# Patient Record
Sex: FEMALE | Race: WHITE | NOT HISPANIC OR LATINO | Employment: OTHER | URBAN - METROPOLITAN AREA
[De-identification: names, ages, dates, MRNs, and addresses within clinical notes are randomized per-mention and may not be internally consistent; named-entity substitution may affect disease eponyms.]

---

## 2018-10-25 ENCOUNTER — HOSPITAL ENCOUNTER (OUTPATIENT)
Dept: RADIOLOGY | Facility: HOSPITAL | Age: 78
Discharge: HOME | End: 2018-10-25
Attending: FAMILY MEDICINE
Payer: MEDICARE

## 2018-10-25 VITALS — BODY MASS INDEX: 23.62 KG/M2 | WEIGHT: 105 LBS | HEIGHT: 56 IN

## 2018-10-25 DIAGNOSIS — Z12.39 SCREENING BREAST EXAMINATION: Primary | ICD-10-CM

## 2018-10-25 DIAGNOSIS — Z12.39 SCREENING BREAST EXAMINATION: ICD-10-CM

## 2018-10-25 PROCEDURE — 77067 SCR MAMMO BI INCL CAD: CPT | Mod: TC,PO

## 2018-10-25 PROCEDURE — 77067 SCR MAMMO BI INCL CAD: CPT | Mod: 26,,, | Performed by: RADIOLOGY

## 2018-10-25 PROCEDURE — 77063 BREAST TOMOSYNTHESIS BI: CPT | Mod: TC,PO

## 2018-10-25 PROCEDURE — 77063 BREAST TOMOSYNTHESIS BI: CPT | Mod: 26,,, | Performed by: RADIOLOGY

## 2019-12-10 ENCOUNTER — OFFICE VISIT (OUTPATIENT)
Dept: INTERNAL MEDICINE | Facility: CLINIC | Age: 79
End: 2019-12-10
Payer: MEDICARE

## 2019-12-10 VITALS
HEIGHT: 55 IN | TEMPERATURE: 97 F | DIASTOLIC BLOOD PRESSURE: 72 MMHG | WEIGHT: 114.19 LBS | OXYGEN SATURATION: 96 % | RESPIRATION RATE: 12 BRPM | SYSTOLIC BLOOD PRESSURE: 122 MMHG | BODY MASS INDEX: 26.43 KG/M2 | HEART RATE: 81 BPM

## 2019-12-10 DIAGNOSIS — G47.09 OTHER INSOMNIA: ICD-10-CM

## 2019-12-10 DIAGNOSIS — I10 ESSENTIAL HYPERTENSION: ICD-10-CM

## 2019-12-10 PROCEDURE — 99999 PR PBB SHADOW E&M-EST. PATIENT-LVL III: CPT | Mod: PBBFAC,,, | Performed by: FAMILY MEDICINE

## 2019-12-10 PROCEDURE — 1126F AMNT PAIN NOTED NONE PRSNT: CPT | Mod: S$GLB,,, | Performed by: FAMILY MEDICINE

## 2019-12-10 PROCEDURE — 1100F PTFALLS ASSESS-DOCD GE2>/YR: CPT | Mod: CPTII,S$GLB,, | Performed by: FAMILY MEDICINE

## 2019-12-10 PROCEDURE — 99203 PR OFFICE/OUTPT VISIT, NEW, LEVL III, 30-44 MIN: ICD-10-PCS | Mod: S$GLB,,, | Performed by: FAMILY MEDICINE

## 2019-12-10 PROCEDURE — 3288F PR FALLS RISK ASSESSMENT DOCUMENTED: ICD-10-PCS | Mod: CPTII,S$GLB,, | Performed by: FAMILY MEDICINE

## 2019-12-10 PROCEDURE — 3288F FALL RISK ASSESSMENT DOCD: CPT | Mod: CPTII,S$GLB,, | Performed by: FAMILY MEDICINE

## 2019-12-10 PROCEDURE — 1159F MED LIST DOCD IN RCRD: CPT | Mod: S$GLB,,, | Performed by: FAMILY MEDICINE

## 2019-12-10 PROCEDURE — 1159F PR MEDICATION LIST DOCUMENTED IN MEDICAL RECORD: ICD-10-PCS | Mod: S$GLB,,, | Performed by: FAMILY MEDICINE

## 2019-12-10 PROCEDURE — 99999 PR PBB SHADOW E&M-EST. PATIENT-LVL III: ICD-10-PCS | Mod: PBBFAC,,, | Performed by: FAMILY MEDICINE

## 2019-12-10 PROCEDURE — 1126F PR PAIN SEVERITY QUANTIFIED, NO PAIN PRESENT: ICD-10-PCS | Mod: S$GLB,,, | Performed by: FAMILY MEDICINE

## 2019-12-10 PROCEDURE — 1100F PR PT FALLS ASSESS DOC 2+ FALLS/FALL W/INJURY/YR: ICD-10-PCS | Mod: CPTII,S$GLB,, | Performed by: FAMILY MEDICINE

## 2019-12-10 PROCEDURE — 99203 OFFICE O/P NEW LOW 30 MIN: CPT | Mod: S$GLB,,, | Performed by: FAMILY MEDICINE

## 2019-12-10 RX ORDER — AMLODIPINE BESYLATE 5 MG/1
5 TABLET ORAL
COMMUNITY
Start: 2019-11-20 | End: 2019-12-10 | Stop reason: SDUPTHER

## 2019-12-10 RX ORDER — LORATADINE 10 MG/1
10 TABLET ORAL DAILY
Qty: 90 TABLET | Refills: 4 | Status: SHIPPED | OUTPATIENT
Start: 2019-12-10 | End: 2020-03-27 | Stop reason: SDUPTHER

## 2019-12-10 RX ORDER — HYDROCHLOROTHIAZIDE 12.5 MG/1
12.5 TABLET ORAL DAILY
Qty: 90 TABLET | Refills: 4 | Status: SHIPPED | OUTPATIENT
Start: 2019-12-10 | End: 2020-03-27 | Stop reason: SDUPTHER

## 2019-12-10 RX ORDER — SIMVASTATIN 40 MG/1
40 TABLET, FILM COATED ORAL NIGHTLY
Qty: 90 TABLET | Refills: 4 | Status: SHIPPED | OUTPATIENT
Start: 2019-12-10 | End: 2020-03-27 | Stop reason: SDUPTHER

## 2019-12-10 RX ORDER — ZOLPIDEM TARTRATE 5 MG/1
5 TABLET ORAL NIGHTLY PRN
Qty: 90 TABLET | Refills: 0 | Status: SHIPPED | OUTPATIENT
Start: 2019-12-10 | End: 2020-03-27 | Stop reason: SDUPTHER

## 2019-12-10 RX ORDER — AMLODIPINE BESYLATE 5 MG/1
5 TABLET ORAL DAILY
Qty: 90 TABLET | Refills: 4 | Status: SHIPPED | OUTPATIENT
Start: 2019-12-10 | End: 2020-07-20 | Stop reason: ALTCHOICE

## 2019-12-10 RX ORDER — LORATADINE 10 MG/1
10 TABLET ORAL DAILY
Refills: 0 | COMMUNITY
Start: 2019-11-20 | End: 2019-12-10 | Stop reason: SDUPTHER

## 2019-12-10 NOTE — PROGRESS NOTES
Subjective:       Patient ID: Lacey Villafana is a 79 y.o. female.    Chief Complaint: Establish Care    HPI    Came in today to establish care.  She has past medical history significant for hypertension and insomnia.  Otherwise has no significant past medical problems.  She lives alone but her son lives next door to her.  She relies on friends to help her get around as she does not drive.  Her  has been  for about 3 years.    No recent falls.      Takes Ambien nightly to help with insomnia.  Taking all other medications as listed below.  Has had a flu shot for this year.    History reviewed. No pertinent family history.    Current Outpatient Medications:     amLODIPine (NORVASC) 5 MG tablet, Take 1 tablet (5 mg total) by mouth once daily., Disp: 90 tablet, Rfl: 4    aspirin (ECOTRIN) 81 MG EC tablet, Take 81 mg by mouth once daily., Disp: , Rfl:     FLUZONE HIGH-DOSE , PF, 180 mcg/0.5 mL Syrg, PHARMACIST ADMINISTERED IMMUNIZATION ADMINISTERED AT TIME OF DISPENSING, Disp: , Rfl: 0    hydroCHLOROthiazide (HYDRODIURIL) 12.5 MG Tab, Take 1 tablet (12.5 mg total) by mouth once daily., Disp: 90 tablet, Rfl: 4    loratadine (CLARITIN) 10 mg tablet, Take 1 tablet (10 mg total) by mouth once daily., Disp: 90 tablet, Rfl: 4    simvastatin (ZOCOR) 40 MG tablet, Take 1 tablet (40 mg total) by mouth every evening., Disp: 90 tablet, Rfl: 4    zolpidem (AMBIEN) 5 MG Tab, Take 1 tablet (5 mg total) by mouth nightly as needed., Disp: 90 tablet, Rfl: 0    Review of Systems   Constitutional: Negative for chills and fever.   HENT: Negative for congestion and sore throat.    Eyes: Negative for visual disturbance.   Respiratory: Negative for cough and shortness of breath.    Cardiovascular: Negative for chest pain.   Gastrointestinal: Negative for abdominal pain, constipation and diarrhea.   Endocrine: Negative for polydipsia and polyuria.   Genitourinary: Negative for difficulty urinating and menstrual  "problem.   Skin: Negative for rash.   Neurological: Negative for dizziness.   Hematological: Does not bruise/bleed easily.       Objective:   /72   Pulse 81   Temp 96.5 °F (35.8 °C) (Tympanic)   Resp 12   Ht 4' 7" (1.397 m)   Wt 51.8 kg (114 lb 3.2 oz)   SpO2 96%   BMI 26.54 kg/m²      Physical Exam   Constitutional: She is oriented to person, place, and time. She appears well-developed and well-nourished. No distress.   HENT:   Head: Normocephalic and atraumatic.   Nose: Nose normal.   Eyes: Pupils are equal, round, and reactive to light. Conjunctivae and EOM are normal. Right eye exhibits no discharge. Left eye exhibits no discharge.   Neck: No thyromegaly present.   Cardiovascular: Normal rate and regular rhythm.   No murmur heard.  Pulmonary/Chest: Effort normal and breath sounds normal. No respiratory distress.   Abdominal: Soft. She exhibits no distension.   Musculoskeletal: She exhibits no edema.   Neurological: She is alert and oriented to person, place, and time.   Skin: No rash noted. She is not diaphoretic.   Psychiatric: She has a normal mood and affect. Her behavior is normal.       Assessment & Plan     Problem List Items Addressed This Visit        Cardiac/Vascular    Essential hypertension (Chronic)    Current Assessment & Plan       Currently at goal.  Continue on same medication and follow up in 6 months.            Other    Other insomnia    Current Assessment & Plan       Refilled Ambien at low dose.  She has not had any negative side effects to  This medicine.                 Follow up in about 6 months (around 6/10/2020).    Disclaimer:  This note may have been prepared using voice recognition software, it may have not been extensively proofed, as such there could be errors within the text such as sound alike errors.  "

## 2020-03-27 RX ORDER — LORATADINE 10 MG/1
10 TABLET ORAL DAILY
Qty: 90 TABLET | Refills: 4 | Status: SHIPPED | OUTPATIENT
Start: 2020-03-27 | End: 2020-07-20 | Stop reason: ALTCHOICE

## 2020-03-27 RX ORDER — ZOLPIDEM TARTRATE 5 MG/1
5 TABLET ORAL NIGHTLY PRN
Qty: 90 TABLET | Refills: 0 | Status: SHIPPED | OUTPATIENT
Start: 2020-03-27 | End: 2020-07-14 | Stop reason: SDUPTHER

## 2020-03-27 RX ORDER — SIMVASTATIN 40 MG/1
40 TABLET, FILM COATED ORAL NIGHTLY
Qty: 90 TABLET | Refills: 4 | Status: SHIPPED | OUTPATIENT
Start: 2020-03-27 | End: 2021-04-06

## 2020-03-27 RX ORDER — HYDROCHLOROTHIAZIDE 12.5 MG/1
12.5 TABLET ORAL DAILY
Qty: 90 TABLET | Refills: 4 | Status: SHIPPED | OUTPATIENT
Start: 2020-03-27 | End: 2020-07-31 | Stop reason: SDUPTHER

## 2020-03-27 NOTE — TELEPHONE ENCOUNTER
----- Message from Susan Pereyra sent at 3/27/2020  8:55 AM CDT -----  Contact: [phone]  Would like to consult with the nurse, patient would like to get a refill on her medication, please call back at [phone], thanks sj  .Type:  RX Refill Request    Who Called:  Ms mckeon  Refill or New Rx:refill  RX Name and Strength:amlodipine 5 mg Also Hydrochlorothiazide 12.5 mg Also loratadine 10 mg Also simvastatin 40 mg Also zolpidem 5 mg  How is the patient currently taking it? (ex. 1XDay):once a day  Is this a 30 day or 90 day RX:  Preferred Pharmacy with phone number:.    [facility] - [place], [place] - [address]  [address] [address]  Phone: [phone] Fax: [phone]      Local or Mail Order: local  Ordering Provider:Dr mckinney  Would the patient rather a call back or a response via [facility]? Call back  Best Call Back Number:[phone]  Additional Information:

## 2020-04-03 ENCOUNTER — TELEPHONE (OUTPATIENT)
Dept: INTERNAL MEDICINE | Facility: CLINIC | Age: 80
End: 2020-04-03

## 2020-04-03 NOTE — TELEPHONE ENCOUNTER
Spoke with pt about medication. [facility]

## 2020-04-03 NOTE — TELEPHONE ENCOUNTER
----- Message from Talia Her sent at 4/3/2020 10:07 AM CDT -----  Contact: pt  Please call pt @ [phone] regarding medication pt need to know the status, pt states she call last week.

## 2020-06-15 ENCOUNTER — TELEPHONE (OUTPATIENT)
Dept: INTERNAL MEDICINE | Facility: CLINIC | Age: 80
End: 2020-06-15

## 2020-06-15 NOTE — TELEPHONE ENCOUNTER
----- Message from Dora Blue sent at 6/15/2020 10:42 AM CDT -----  States her RT leg is swollen. She would like the nurse to give her a call. Please call pt [phone]. Thank you

## 2020-06-15 NOTE — TELEPHONE ENCOUNTER
Patient contacted and informed that per Dr. Davis she would have to be evaluated for her leg swelling. Patient verbally understood and stated that she has an upcoming appt on tomorrow with Dr. Diallo for 8:40 am. Patient verbally understood the appt information.

## 2020-06-15 NOTE — TELEPHONE ENCOUNTER
Patient called in stating that her RT leg is swollen, but it's not hurting her. She does state that she has been working outside standing on the cement (painting) & wonders if this could be causing her RT leg to swell. LOV:  12/10/2019

## 2020-06-16 ENCOUNTER — HOSPITAL ENCOUNTER (OUTPATIENT)
Dept: RADIOLOGY | Facility: HOSPITAL | Age: 80
Discharge: HOME | End: 2020-06-16
Attending: FAMILY MEDICINE
Payer: MEDICARE

## 2020-06-16 ENCOUNTER — OFFICE VISIT (OUTPATIENT)
Dept: INTERNAL MEDICINE | Facility: CLINIC | Age: 80
End: 2020-06-16
Payer: MEDICARE

## 2020-06-16 ENCOUNTER — TELEPHONE (OUTPATIENT)
Dept: INTERNAL MEDICINE | Facility: CLINIC | Age: 80
End: 2020-06-16

## 2020-06-16 ENCOUNTER — TELEPHONE (OUTPATIENT)
Dept: ORTHOPEDICS | Facility: CLINIC | Age: 80
End: 2020-06-16

## 2020-06-16 VITALS
WEIGHT: 116.38 LBS | OXYGEN SATURATION: 98 % | SYSTOLIC BLOOD PRESSURE: 142 MMHG | HEIGHT: 55 IN | DIASTOLIC BLOOD PRESSURE: 78 MMHG | HEART RATE: 70 BPM | BODY MASS INDEX: 26.93 KG/M2 | RESPIRATION RATE: 18 BRPM | TEMPERATURE: 99 F

## 2020-06-16 DIAGNOSIS — M25.561 CHRONIC PAIN OF RIGHT KNEE: ICD-10-CM

## 2020-06-16 DIAGNOSIS — R60.1 GENERALIZED EDEMA: ICD-10-CM

## 2020-06-16 DIAGNOSIS — G89.29 CHRONIC PAIN OF RIGHT KNEE: ICD-10-CM

## 2020-06-16 DIAGNOSIS — R60.1 GENERALIZED EDEMA: Primary | ICD-10-CM

## 2020-06-16 PROCEDURE — 99214 OFFICE O/P EST MOD 30 MIN: CPT | Mod: S$GLB,,, | Performed by: FAMILY MEDICINE

## 2020-06-16 PROCEDURE — 73562 X-RAY EXAM OF KNEE 3: CPT | Mod: 26,50,, | Performed by: RADIOLOGY

## 2020-06-16 PROCEDURE — 93971 US LOWER EXTREMITY VEINS RIGHT: ICD-10-PCS | Mod: 26,RT,, | Performed by: RADIOLOGY

## 2020-06-16 PROCEDURE — 1101F PR PT FALLS ASSESS DOC 0-1 FALLS W/OUT INJ PAST YR: ICD-10-PCS | Mod: CPTII,S$GLB,, | Performed by: FAMILY MEDICINE

## 2020-06-16 PROCEDURE — 73562 X-RAY EXAM OF KNEE 3: CPT | Mod: TC,50,PO

## 2020-06-16 PROCEDURE — 1125F AMNT PAIN NOTED PAIN PRSNT: CPT | Mod: S$GLB,,, | Performed by: FAMILY MEDICINE

## 2020-06-16 PROCEDURE — 99214 PR OFFICE/OUTPT VISIT, EST, LEVL IV, 30-39 MIN: ICD-10-PCS | Mod: S$GLB,,, | Performed by: FAMILY MEDICINE

## 2020-06-16 PROCEDURE — 3078F DIAST BP <80 MM HG: CPT | Mod: CPTII,S$GLB,, | Performed by: FAMILY MEDICINE

## 2020-06-16 PROCEDURE — 1159F MED LIST DOCD IN RCRD: CPT | Mod: S$GLB,,, | Performed by: FAMILY MEDICINE

## 2020-06-16 PROCEDURE — 1159F PR MEDICATION LIST DOCUMENTED IN MEDICAL RECORD: ICD-10-PCS | Mod: S$GLB,,, | Performed by: FAMILY MEDICINE

## 2020-06-16 PROCEDURE — 93971 EXTREMITY STUDY: CPT | Mod: 26,RT,, | Performed by: RADIOLOGY

## 2020-06-16 PROCEDURE — 1125F PR PAIN SEVERITY QUANTIFIED, PAIN PRESENT: ICD-10-PCS | Mod: S$GLB,,, | Performed by: FAMILY MEDICINE

## 2020-06-16 PROCEDURE — 1101F PT FALLS ASSESS-DOCD LE1/YR: CPT | Mod: CPTII,S$GLB,, | Performed by: FAMILY MEDICINE

## 2020-06-16 PROCEDURE — 99999 PR PBB SHADOW E&M-EST. PATIENT-LVL V: ICD-10-PCS | Mod: PBBFAC,,, | Performed by: FAMILY MEDICINE

## 2020-06-16 PROCEDURE — 3077F PR MOST RECENT SYSTOLIC BLOOD PRESSURE >= 140 MM HG: ICD-10-PCS | Mod: CPTII,S$GLB,, | Performed by: FAMILY MEDICINE

## 2020-06-16 PROCEDURE — 3078F PR MOST RECENT DIASTOLIC BLOOD PRESSURE < 80 MM HG: ICD-10-PCS | Mod: CPTII,S$GLB,, | Performed by: FAMILY MEDICINE

## 2020-06-16 PROCEDURE — 99999 PR PBB SHADOW E&M-EST. PATIENT-LVL V: CPT | Mod: PBBFAC,,, | Performed by: FAMILY MEDICINE

## 2020-06-16 PROCEDURE — 3077F SYST BP >= 140 MM HG: CPT | Mod: CPTII,S$GLB,, | Performed by: FAMILY MEDICINE

## 2020-06-16 PROCEDURE — 73562 XR KNEE ORTHO BILAT: ICD-10-PCS | Mod: 26,50,, | Performed by: RADIOLOGY

## 2020-06-16 PROCEDURE — 93971 EXTREMITY STUDY: CPT | Mod: TC,PO,RT

## 2020-06-16 RX ORDER — GLUCOSAMINE/CHONDRO SU A 500-400 MG
1 TABLET ORAL 3 TIMES DAILY
COMMUNITY

## 2020-06-16 NOTE — ASSESSMENT & PLAN NOTE
-Did discuss some causes of extremity swelling such as venous insufficiency.  Amlodipine can also cause swelling however both of these are usually bilateral as opposed unilateral.  did explain that some swelling may due to knee  -Did order ultrasound for reassurance as the patient and relative were very concerned

## 2020-06-16 NOTE — PROGRESS NOTES
Patient ID: Lacey Villafana is a 79 y.o. female.    Chief Complaint: Right leg swelling    HPI 78 yo F with complaint of right leg swelling from thigh to knee that she noticed a few days ago.  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 Usually sitting.  Denies any trauma or any other precipitating factors.  Swelling has improved since onset but patient and accompanying relative were concern about DVT. No erythema. Did have mild associated thigh and knee pain. Has tried no treatment modalities. No associated SOB or chest pain.    Also reports intermittent right knee pain, present for years.  Had injections in the past.  No treatment modalities tried recently.    History reviewed. No pertinent family history.    Current Outpatient Medications:     amLODIPine (NORVASC) 5 MG tablet, Take 1 tablet (5 mg total) by mouth once daily., Disp: 90 tablet, Rfl: 4    aspirin (ECOTRIN) 81 MG EC tablet, Take 81 mg by mouth once daily., Disp: , Rfl:     glucosamine-chondroitin 500-400 mg tablet, Take 1 tablet by mouth 3 (three) times daily., Disp: , Rfl:     hydroCHLOROthiazide (HYDRODIURIL) 12.5 MG Tab, Take 1 tablet (12.5 mg total) by mouth once daily., Disp: 90 tablet, Rfl: 4    Lactobacillus rhamnosus GG (CULTURELLE) 10 billion cell capsule, Take 1 capsule by mouth once daily., Disp: , Rfl:     simvastatin (ZOCOR) 40 MG tablet, Take 1 tablet (40 mg total) by mouth every evening., Disp: 90 tablet, Rfl: 4    zolpidem (AMBIEN) 5 MG Tab, Take 1 tablet (5 mg total) by mouth nightly as needed., Disp: 90 tablet, Rfl: 0    FLUZONE HIGH-DOSE 2019-20, PF, 180 mcg/0.5 mL Syrg, PHARMACIST ADMINISTERED IMMUNIZATION ADMINISTERED AT TIME OF DISPENSING, Disp: , Rfl: 0    loratadine (CLARITIN) 10 mg tablet, Take 1 tablet (10 mg total) by mouth once daily. (Patient not taking: Reported on 6/16/2020), Disp: 90 tablet, Rfl: 4    Review of Systems   Constitutional: Negative for chills and fever.   Respiratory:  "Negative for shortness of breath.    Cardiovascular: Positive for leg swelling. Negative for chest pain.   Musculoskeletal: Positive for arthralgias and joint swelling.   Neurological: Negative for weakness and numbness.       Objective:   BP (!) 142/78   Pulse 70   Temp 98.6 °F (37 °C) (Tympanic)   Resp 18   Ht 4' 7" (1.397 m)   Wt 52.8 kg (116 lb 6.5 oz)   SpO2 98%   BMI 27.05 kg/m²      Physical Exam  Constitutional:       Appearance: She is well-developed.   HENT:      Head: Normocephalic and atraumatic.   Eyes:      Conjunctiva/sclera: Conjunctivae normal.   Neck:      Musculoskeletal: Normal range of motion.   Cardiovascular:      Rate and Rhythm: Normal rate and regular rhythm.      Heart sounds: Normal heart sounds.   Pulmonary:      Effort: Pulmonary effort is normal.      Breath sounds: Normal breath sounds.   Abdominal:      Palpations: Abdomen is soft.   Musculoskeletal: Normal range of motion.         General: No tenderness.      Right lower leg: Edema (mild. calf circumference 32.5 cm rle, lle 32 cm) present.      Comments: No RLE erythema or ttp. No restricted rom   Skin:     General: Skin is warm.   Neurological:      Mental Status: She is alert and oriented to person, place, and time.   Psychiatric:         Behavior: Behavior normal.         Assessment & Plan     Problem List Items Addressed This Visit        Other    Generalized edema  - Primary    Current Assessment & Plan     -Did discuss some causes of extremity swelling such as venous insufficiency.  Amlodipine can also cause swelling however both of these are usually bilateral as opposed unilateral.  did explain that some swelling may due to knee  -Did order ultrasound for reassurance as the patient and relative were very concerned           Relevant Orders    US Lower Extremity Veins Right      Other Visit Diagnoses     Chronic pain of right knee        -had knee injections years ago. would like to get established with ortho to discuss " tx options    Relevant Orders    X-ray Knee Ortho Bilateral    Ambulatory referral/consult to Orthopedics            Disclaimer:  This note may have been prepared using voice recognition software, without extensive proofreading. As such, there could be errors within the text such as sound alike errors.

## 2020-06-16 NOTE — TELEPHONE ENCOUNTER
Please contact and schedule patient for orthopedic appt with Dr. Antonio Carson. Dr. Diallo added referral to system and i'm unable to schedule [facility] ([place] location. Thank you.

## 2020-06-17 ENCOUNTER — TELEPHONE (OUTPATIENT)
Dept: ORTHOPEDICS | Facility: CLINIC | Age: 80
End: 2020-06-17

## 2020-06-18 ENCOUNTER — TELEPHONE (OUTPATIENT)
Dept: ORTHOPEDICS | Facility: CLINIC | Age: 80
End: 2020-06-18

## 2020-07-14 NOTE — TELEPHONE ENCOUNTER
----- Message from Courtney Boyce sent at 7/14/2020  9:43 AM CDT -----  Type:  RX Refill Request    Who Called:  Pt daughter in Law  (Salma)  Refill or New Rx:   refill   RX Name and Strength:   Ambien   How is the patient currently taking it? (ex. 1XDay): Takes once daily at bedtime  Is this a 30 day or 90 day RX:     Preferred Pharmacy with phone number: [facility] in [place] (pt usually uses mail order but since she is out of the med she will need to use [facility] this time)  Local or Mail Order:  Local   Ordering Provider:  Dr Davis  Would the patient rather a call back or a response via [facility]?   Call back  Best Call Back Number:  [phone]  Additional Information:   Please call//hai/[facility]

## 2020-07-15 RX ORDER — ZOLPIDEM TARTRATE 5 MG/1
5 TABLET ORAL NIGHTLY PRN
Qty: 90 TABLET | Refills: 0 | Status: SHIPPED | OUTPATIENT
Start: 2020-07-15 | End: 2020-10-12 | Stop reason: SDUPTHER

## 2020-07-20 ENCOUNTER — OFFICE VISIT (OUTPATIENT)
Dept: INTERNAL MEDICINE | Facility: CLINIC | Age: 80
End: 2020-07-20
Payer: MEDICARE

## 2020-07-20 DIAGNOSIS — R73.03 PREDIABETES: ICD-10-CM

## 2020-07-20 DIAGNOSIS — R53.83 FATIGUE, UNSPECIFIED TYPE: ICD-10-CM

## 2020-07-20 DIAGNOSIS — R05.9 COUGH: Primary | ICD-10-CM

## 2020-07-20 DIAGNOSIS — I10 ESSENTIAL HYPERTENSION: ICD-10-CM

## 2020-07-20 DIAGNOSIS — G47.09 OTHER INSOMNIA: ICD-10-CM

## 2020-07-20 PROCEDURE — 99442 PR PHYSICIAN TELEPHONE EVALUATION 11-20 MIN: CPT | Mod: 95,,, | Performed by: NURSE PRACTITIONER

## 2020-07-20 PROCEDURE — 99442 PR PHYSICIAN TELEPHONE EVALUATION 11-20 MIN: ICD-10-PCS | Mod: 95,,, | Performed by: NURSE PRACTITIONER

## 2020-07-20 NOTE — ASSESSMENT & PLAN NOTE
Last A1c on 11/20/2019 was 5.8.  Rechecking with routine labs.  Managed with lifestyle modifications.

## 2020-07-20 NOTE — ASSESSMENT & PLAN NOTE
Patient contributes this to recurrent sinus issues, but symptoms started today with fatigue.  Counseled on importance of maintaining social distancing, hand hygiene, and covering mouth with coughing or sneezing.  Given COVID precautions.  Patient opted out of getting swap for COVID.  Recommended that patient weight the recommended 14 days for quarantine prior to coming back to get routine labs to ensure that she is feeling better prior to labs being drawn.  Recommended symptomatic treatment with OTC antihistamine and flonase for URI symptoms.

## 2020-07-20 NOTE — ASSESSMENT & PLAN NOTE
Previously at goal, but unable to assess at this visit.  Patient reports that she is only taking HCTZ.  No longer taking amlodipine.  Recommended that patient come back for an office visit for hypertension follow-up after she is feeling better.

## 2020-07-20 NOTE — PROGRESS NOTES
Established Patient - Audio Only Telehealth Visit     The patient location is: [place]  The chief complaint leading to consultation is:  6 month follow up, HTN, insomnia. Cough, fatigue and nasal congestion onset today.  Visit type: Virtual visit with audio only (telephone)  Total time spent with patient: 16 minutes       The reason for the audio only service rather than synchronous audio and video virtual visit was related to technical difficulties or patient preference/necessity.     Each patient to whom I provide medical services by telemedicine is:  (1) informed of the relationship between the physician and patient and the respective role of any other health care provider with respect to management of the patient; and (2) notified that they may decline to receive medical services by telemedicine and may withdraw from such care at any time. Patient verbally consented to receive this service via voice-only telephone call.       This service was not originating from a related E/M service provided within the previous 7 days nor will  to an E/M service or procedure within the next 24 hours or my soonest available appointment.  Prevailing standard of care was able to be met in this audio-only visit.      Subjective:       Patient ID: Lacey Villafana is a 79 y.o. female.    Chief Complaint: No chief complaint on file.    Mrs. Villafana presented to clinic for 6 month follow-up visit, but was changed audio visit due to recent onset of cough and fatigue.  The symptoms started this morning.  She reports that she always has recurrent postnasal drip and sinus congestion that causes her to have cough, and contributed the fatigued to old age, having company this weekend, and working in the [employer] yesterday.  She denies any fever, shortness of breath, chest discomfort, headaches, or body aches.  She has not taken anything for the symptoms.  She is also enquiring about her routine labs today.  Labs reviewed from [facility]  everywhere.     Cough  This is a recurrent problem. The current episode started today. The problem has been waxing and waning. The cough is non-productive. Associated symptoms include nasal congestion, postnasal drip and rhinorrhea. Pertinent negatives include no chest pain, chills, ear pain, fever, headaches, myalgias, sore throat, shortness of breath or wheezing. She has tried nothing for the symptoms. The treatment provided no relief. There is no history of asthma, bronchiectasis, bronchitis, COPD, emphysema or pneumonia.       Patient Active Problem List   Diagnosis    Other insomnia    Essential hypertension    Generalized edema     Cough    Prediabetes    Fatigue       History reviewed. No pertinent family history.  Past Surgical History:   Procedure Laterality Date    CHOLECYSTECTOMY      HEMORRHOID SURGERY      HYSTERECTOMY      OOPHORECTOMY           Current Outpatient Medications:     aspirin (ECOTRIN) 81 MG EC tablet, Take 81 mg by mouth once daily., Disp: , Rfl:     glucosamine-chondroitin 500-400 mg tablet, Take 1 tablet by mouth 3 (three) times daily., Disp: , Rfl:     hydroCHLOROthiazide (HYDRODIURIL) 12.5 MG Tab, Take 1 tablet (12.5 mg total) by mouth once daily., Disp: 90 tablet, Rfl: 4    Lactobacillus rhamnosus GG (CULTURELLE) 10 billion cell capsule, Take 1 capsule by mouth once daily., Disp: , Rfl:     simvastatin (ZOCOR) 40 MG tablet, Take 1 tablet (40 mg total) by mouth every evening., Disp: 90 tablet, Rfl: 4    zolpidem (AMBIEN) 5 MG Tab, Take 1 tablet (5 mg total) by mouth nightly as needed., Disp: 90 tablet, Rfl: 0    Review of Systems   Constitutional: Positive for fatigue. Negative for activity change, appetite change, chills and fever.   HENT: Positive for congestion, postnasal drip and rhinorrhea. Negative for ear pain, sinus pressure, sinus pain, sneezing and sore throat.    Respiratory: Positive for cough. Negative for chest tightness, shortness of breath and wheezing.     Cardiovascular: Negative for chest pain and palpitations.   Gastrointestinal: Negative for abdominal pain, constipation, diarrhea, nausea and vomiting.   Genitourinary: Negative for dysuria, frequency and urgency.   Musculoskeletal: Negative for back pain, gait problem and myalgias.   Neurological: Negative for dizziness, light-headedness, numbness and headaches.       Objective:   There were no vitals taken for this visit.     Physical Exam  Constitutional:       General: She is not in acute distress.     Comments: Audio visit only.  Speech clear, fluent, and without any signs of distress.  No cough appreciated during phone visit.   Neurological:      Mental Status: She is alert and oriented to person, place, and time.   Psychiatric:         Mood and Affect: Mood normal.         Assessment & Plan     Problem List Items Addressed This Visit        Pulmonary    Cough - Primary    Current Assessment & Plan     Patient contributes this to recurrent sinus issues, but symptoms started today with fatigue.  Counseled on importance of maintaining social distancing, hand hygiene, and covering mouth with coughing or sneezing.  Given COVID precautions.  Patient opted out of getting swap for COVID.  Recommended that patient weight the recommended 14 days for quarantine prior to coming back to get routine labs to ensure that she is feeling better prior to labs being drawn.  Recommended symptomatic treatment with OTC antihistamine and flonase for URI symptoms.             Cardiac/Vascular    Essential hypertension (Chronic)    Current Assessment & Plan     Previously at goal, but unable to assess at this visit.  Patient reports that she is only taking HCTZ.  No longer taking amlodipine.  Recommended that patient come back for an office visit for hypertension follow-up after she is feeling better.         Relevant Orders    CBC auto differential    Comprehensive metabolic panel    Lipid Panel       Endocrine    Prediabetes     "Current Assessment & Plan     Last A1c on 11/20/2019 was 5.8.  Rechecking with routine labs.  Managed with lifestyle modifications.          Relevant Orders    Comprehensive metabolic panel    Hemoglobin A1C    Lipid Panel       Other    Other insomnia    Current Assessment & Plan     Recently had Ambien refilled.  Working well for insomnia.         Fatigue    Current Assessment & Plan     Given COVID precautions.  May be secondary to exertional week in for patient, but taking precautions since patient opted out of being tested for COVID and since symptoms started today.    Rest.  Maintain adequate fluid intake.                No follow-ups on file.          Portions of this note may have been created with voice recognition software. Occasional "wrong-word" or "sound-a-like" substitutions may have occurred due to the inherent limitations of voice recognition software. Please, read the note carefully and recognize, using context, where substitutions have occurred.     "

## 2020-07-20 NOTE — ASSESSMENT & PLAN NOTE
Given COVID precautions.  May be secondary to exertional week in for patient, but taking precautions since patient opted out of being tested for COVID and since symptoms started today.    Rest.  Maintain adequate fluid intake.

## 2020-07-22 ENCOUNTER — TELEPHONE (OUTPATIENT)
Dept: ORTHOPEDICS | Facility: CLINIC | Age: 80
End: 2020-07-22

## 2020-07-22 ENCOUNTER — PATIENT OUTREACH (OUTPATIENT)
Dept: ADMINISTRATIVE | Facility: OTHER | Age: 80
End: 2020-07-22

## 2020-07-22 DIAGNOSIS — M25.561 PAIN IN BOTH KNEES, UNSPECIFIED CHRONICITY: Primary | ICD-10-CM

## 2020-07-22 DIAGNOSIS — M25.562 PAIN IN BOTH KNEES, UNSPECIFIED CHRONICITY: Primary | ICD-10-CM

## 2020-07-22 NOTE — TELEPHONE ENCOUNTER
1st attempt to contact patient to arrive 30min prior to appt to get xrays done. Patient did not answer. Unable to leave -DD

## 2020-07-28 RX ORDER — HYDROCHLOROTHIAZIDE 12.5 MG/1
12.5 TABLET ORAL DAILY
Qty: 90 TABLET | Refills: 4 | Status: CANCELLED | OUTPATIENT
Start: 2020-07-28

## 2020-07-28 NOTE — TELEPHONE ENCOUNTER
Spoke with pt about meds. Advised that she has refills at [facility] pharmacy. Pt will call to get meds filled

## 2020-07-28 NOTE — TELEPHONE ENCOUNTER
----- Message from Aysha Fox sent at 7/28/2020  8:31 AM CDT -----  .Type:  RX Refill Request    Who Called: Lacey Villafana  Refill or New Rx:refill  RX Name and Strength:Hydrochlorthiazide  12.5mg  How is the patient currently taking it? (ex. 1XDay):Daily  Is this a 30 day or 90 day RX:30  Preferred Pharmacy with phone number:.  [facility] - [place], [place] - [address]  [address] [address]  Phone: [phone] Fax: [phone]      Local or Mail Order:;local  Ordering Provider:. Dr. Davis  Would the patient rather a call back or a response via [facility]? Call back  Best Call Back Number:[phone]

## 2020-07-31 RX ORDER — HYDROCHLOROTHIAZIDE 12.5 MG/1
12.5 TABLET ORAL DAILY
Qty: 30 TABLET | Refills: 0 | Status: SHIPPED | OUTPATIENT
Start: 2020-07-31 | End: 2020-12-30

## 2020-08-17 ENCOUNTER — PATIENT OUTREACH (OUTPATIENT)
Dept: ADMINISTRATIVE | Facility: OTHER | Age: 80
End: 2020-08-17

## 2020-08-18 ENCOUNTER — OFFICE VISIT (OUTPATIENT)
Dept: ORTHOPEDICS | Facility: CLINIC | Age: 80
End: 2020-08-18
Payer: MEDICARE

## 2020-08-18 ENCOUNTER — HOSPITAL ENCOUNTER (OUTPATIENT)
Dept: RADIOLOGY | Facility: HOSPITAL | Age: 80
Discharge: HOME | End: 2020-08-18
Attending: FAMILY MEDICINE
Payer: MEDICARE

## 2020-08-18 VITALS
HEART RATE: 95 BPM | DIASTOLIC BLOOD PRESSURE: 71 MMHG | WEIGHT: 116.38 LBS | HEIGHT: 55 IN | BODY MASS INDEX: 26.93 KG/M2 | SYSTOLIC BLOOD PRESSURE: 160 MMHG

## 2020-08-18 DIAGNOSIS — M25.562 PAIN IN BOTH KNEES, UNSPECIFIED CHRONICITY: ICD-10-CM

## 2020-08-18 DIAGNOSIS — M17.0 PRIMARY OSTEOARTHRITIS OF BOTH KNEES: Primary | ICD-10-CM

## 2020-08-18 DIAGNOSIS — M25.561 PAIN IN BOTH KNEES, UNSPECIFIED CHRONICITY: ICD-10-CM

## 2020-08-18 DIAGNOSIS — I10 ESSENTIAL HYPERTENSION: Chronic | ICD-10-CM

## 2020-08-18 DIAGNOSIS — R03.0 ELEVATED BLOOD PRESSURE READING: ICD-10-CM

## 2020-08-18 DIAGNOSIS — R60.9 EDEMA, UNSPECIFIED TYPE: ICD-10-CM

## 2020-08-18 PROCEDURE — 73560 XR KNEE 1 OR 2 VIEW BILATERAL: ICD-10-PCS | Mod: 26,50,, | Performed by: RADIOLOGY

## 2020-08-18 PROCEDURE — 1159F MED LIST DOCD IN RCRD: CPT | Mod: S$GLB,,, | Performed by: FAMILY MEDICINE

## 2020-08-18 PROCEDURE — 1101F PR PT FALLS ASSESS DOC 0-1 FALLS W/OUT INJ PAST YR: ICD-10-PCS | Mod: CPTII,S$GLB,, | Performed by: FAMILY MEDICINE

## 2020-08-18 PROCEDURE — 99214 PR OFFICE/OUTPT VISIT, EST, LEVL IV, 30-39 MIN: ICD-10-PCS | Mod: S$GLB,,, | Performed by: FAMILY MEDICINE

## 2020-08-18 PROCEDURE — 3077F SYST BP >= 140 MM HG: CPT | Mod: CPTII,S$GLB,, | Performed by: FAMILY MEDICINE

## 2020-08-18 PROCEDURE — 99999 PR PBB SHADOW E&M-EST. PATIENT-LVL III: CPT | Mod: PBBFAC,,, | Performed by: FAMILY MEDICINE

## 2020-08-18 PROCEDURE — 3078F PR MOST RECENT DIASTOLIC BLOOD PRESSURE < 80 MM HG: ICD-10-PCS | Mod: CPTII,S$GLB,, | Performed by: FAMILY MEDICINE

## 2020-08-18 PROCEDURE — 1125F PR PAIN SEVERITY QUANTIFIED, PAIN PRESENT: ICD-10-PCS | Mod: S$GLB,,, | Performed by: FAMILY MEDICINE

## 2020-08-18 PROCEDURE — 73560 X-RAY EXAM OF KNEE 1 OR 2: CPT | Mod: TC,50,PO

## 2020-08-18 PROCEDURE — 1159F PR MEDICATION LIST DOCUMENTED IN MEDICAL RECORD: ICD-10-PCS | Mod: S$GLB,,, | Performed by: FAMILY MEDICINE

## 2020-08-18 PROCEDURE — 73560 X-RAY EXAM OF KNEE 1 OR 2: CPT | Mod: 26,50,, | Performed by: RADIOLOGY

## 2020-08-18 PROCEDURE — 3078F DIAST BP <80 MM HG: CPT | Mod: CPTII,S$GLB,, | Performed by: FAMILY MEDICINE

## 2020-08-18 PROCEDURE — 99214 OFFICE O/P EST MOD 30 MIN: CPT | Mod: S$GLB,,, | Performed by: FAMILY MEDICINE

## 2020-08-18 PROCEDURE — 99999 PR PBB SHADOW E&M-EST. PATIENT-LVL III: ICD-10-PCS | Mod: PBBFAC,,, | Performed by: FAMILY MEDICINE

## 2020-08-18 PROCEDURE — 1125F AMNT PAIN NOTED PAIN PRSNT: CPT | Mod: S$GLB,,, | Performed by: FAMILY MEDICINE

## 2020-08-18 PROCEDURE — 1101F PT FALLS ASSESS-DOCD LE1/YR: CPT | Mod: CPTII,S$GLB,, | Performed by: FAMILY MEDICINE

## 2020-08-18 PROCEDURE — 3077F PR MOST RECENT SYSTOLIC BLOOD PRESSURE >= 140 MM HG: ICD-10-PCS | Mod: CPTII,S$GLB,, | Performed by: FAMILY MEDICINE

## 2020-08-18 NOTE — PATIENT INSTRUCTIONS
Elevate leg in apply ice for 15 min at a time if any swelling    If pain shortness of breath or swelling last for more than 24 hr contact primary care physician    Keep salt level low in diet

## 2020-08-18 NOTE — PROGRESS NOTES
Subjective:     Patient ID: Lacey Villafana is a 80 y.o. female.    Chief Complaint: Pain of the Right Knee      HPI:  Patient states that she has had swelling noticed by herself and her family off and on over the last few months and she wanted to get this checked.  She says the swelling is in her thigh just above her knee, in the knee area and in the lower leg.    She had this evaluated by primary care and had a negative ultrasound for DVT.  She says that in general she tries to keep the amount of salt low in her diet.  She is on low-dose of diuretic.    She states that she really has no pain associated with this swelling and no dysfunction or disability.  She says that she is very active for her age and that the problem does not inhibit her in any way.  She states that her left shoulder sometimes hurts at night but she does not desire and evaluation at this time.  She says she does not wear constrictive clothing and has not had any recent illness.    She is advised that her blood pressure is elevated 160 today but she states that she has no associated shortness of breath or chest pain and will follow-up with her primary care physician.    The patient states that she has not actually had any knee problems except that she got some type of injection into both knees about 30 years ago 1 day when she had severe pain and had trouble standing but that the injections seem to fix the problem and she has not had knee trouble since then.    Past Medical History:   Diagnosis Date    Hypertension      Past Surgical History:   Procedure Laterality Date    CHOLECYSTECTOMY      HEMORRHOID SURGERY      HYSTERECTOMY      OOPHORECTOMY       History reviewed. No pertinent family history.  Social History     Socioeconomic History    Marital status:      Spouse name: Not on file    Number of children: Not on file    Years of education: Not on file    Highest education level: Not on file   Occupational History    Not  on file   Social Needs    Financial resource strain: Not on file    Food insecurity     Worry: Not on file     Inability: Not on file    Transportation needs     Medical: Not on file     Non-medical: Not on file   Tobacco Use    Smoking status: Never Smoker    Smokeless tobacco: Never Used   Substance and Sexual Activity    Alcohol use: No    Drug use: No    Sexual activity: Not on file   Lifestyle    Physical activity     Days per week: Not on file     Minutes per session: Not on file    Stress: Not on file   Relationships    Social connections     Talks on phone: Not on file     Gets together: Not on file     Attends Rastafari service: Not on file     Active member of club or organization: Not on file     Attends meetings of clubs or organizations: Not on file     Relationship status: Not on file   Other Topics Concern    Not on file   Social History Narrative    Not on file       Current Outpatient Medications:     aspirin (ECOTRIN) 81 MG EC tablet, Take 81 mg by mouth once daily., Disp: , Rfl:     glucosamine-chondroitin 500-400 mg tablet, Take 1 tablet by mouth 3 (three) times daily., Disp: , Rfl:     hydroCHLOROthiazide (HYDRODIURIL) 12.5 MG Tab, Take 1 tablet (12.5 mg total) by mouth once daily., Disp: 30 tablet, Rfl: 0    Lactobacillus rhamnosus GG (CULTURELLE) 10 billion cell capsule, Take 1 capsule by mouth once daily., Disp: , Rfl:     simvastatin (ZOCOR) 40 MG tablet, Take 1 tablet (40 mg total) by mouth every evening., Disp: 90 tablet, Rfl: 4    zolpidem (AMBIEN) 5 MG Tab, Take 1 tablet (5 mg total) by mouth nightly as needed., Disp: 90 tablet, Rfl: 0  Review of patient's allergies indicates:   Allergen Reactions    Vioxx [rofecoxib]      Review of Systems   Constitutional: Negative for chills, fever and weight loss.   Respiratory: Negative for shortness of breath.    Cardiovascular: Negative for chest pain and palpitations.       Objective:   Body mass index is 27.05 kg/m².  Vitals:     08/18/20 1002   BP: (!) 160/71   Pulse: 95           Ortho/SPM Exam-alert and oriented well-nourished well-developed ambulatory very pleasant adult female no acute distress    Respiratory effort is normal    Right knee-no acute deformity but bony chronic changes noted    No swelling or discoloration noted of the 5 are knee region.    A 1+ mildly pitting nontender edema minute pretibial region bilateral    Knee range of motion is 0-100 10°    Mild varus and valgus laxity and negative Lachman's    Strength is 3 to 4/5    Neurovascular intact    Psychiatric good affect and cognition    Discussion-I talked to the patient in detail about this problem and it does not seem to inhibit her activities at all or cause her significant pain.  She does not take Tylenol or ibuprofen.  She states she mainly wanted it checked because a family member noticed that she had some swelling.  I advised her that she has had a good primary care evaluation regarding her swelling and that DVT was ruled out.  She tells me that the swelling is always gone by the next morning.    I have recommended that she let her primary care or us know if her swelling becomes more severe, painful, causes dysfunction or last more than 24 hr.  Also offered to evaluate the patient's left shoulder pain at any time in the future if she desires.    Appreciate the opportunity to see this nice lady today and evaluate her knee and leg swelling and have recommended that she continue follow-up closely with her primary care and she does have elevated blood pressure 160 today although no symptoms associated with this but she is reminded to follow-up with her PCP.    IMAGING: X-Ray Knee 1 or 2 View Bilateral  Narrative: EXAMINATION:  XR KNEE 1 OR 2 VIEW BILATERAL    CLINICAL HISTORY:  flexion view only;  Pain in right knee    TECHNIQUE:  Single PA flexion view of the knees    COMPARISON:  06/16/2020    FINDINGS:  Bilateral tricompartmental degenerative changes are seen  most severe in the medial compartments with prominent joint space narrowing and adjacent marginal spurring.  No fracture or dislocation.  No significant interval change.  Impression: As above    Electronically signed by: Rashaad Fajardo MD  Date:    08/18/2020  Time:    08:55       Radiographs / Imaging : Relevant imaging results reviewed by me and interpreted by me, discussed with the patient and / or family -radiographs reviewed with the patient and fairly severe degenerative changes are noted that the patient is relatively asymptomatic and no acute dislocation or fractures seen.      Assessment:     Encounter Diagnoses   Name Primary?    Primary osteoarthritis of both knees Yes    Essential hypertension     Elevated blood pressure reading     Edema, unspecified type         Plan:   Primary osteoarthritis of both knees    Essential hypertension    Elevated blood pressure reading    Edema, unspecified type        The patient verbalized good understanding of the medical issues discussed today and expressed appreciation for the care provided.  Patient was given the opportunity to ask questions and be an active participant in their medical care. Patient had no further questions or concerns at this time.     The patient was encouraged to participate in appropriate physical activity.      Scott Carson M.D.  [facility]        This note was dictated using voice recognition software and may have sound a like errors.

## 2020-09-03 ENCOUNTER — TELEPHONE (OUTPATIENT)
Dept: INTERNAL MEDICINE | Facility: CLINIC | Age: 80
End: 2020-09-03

## 2020-09-03 NOTE — TELEPHONE ENCOUNTER
----- Message from Serenity Aranda sent at 9/3/2020  8:38 AM CDT -----  Contact: self/[phone]  Would like to consult with nurse regarding patient states she can't walk. Please call back at [phone]. Thanks/ar

## 2020-09-17 ENCOUNTER — OFFICE VISIT (OUTPATIENT)
Dept: INTERNAL MEDICINE | Facility: CLINIC | Age: 80
End: 2020-09-17
Payer: MEDICARE

## 2020-09-17 ENCOUNTER — LAB VISIT (OUTPATIENT)
Dept: LAB | Facility: HOSPITAL | Age: 80
End: 2020-09-17
Attending: FAMILY MEDICINE
Payer: MEDICARE

## 2020-09-17 VITALS
HEART RATE: 85 BPM | TEMPERATURE: 99 F | RESPIRATION RATE: 16 BRPM | WEIGHT: 114.19 LBS | OXYGEN SATURATION: 96 % | BODY MASS INDEX: 26.43 KG/M2 | DIASTOLIC BLOOD PRESSURE: 58 MMHG | SYSTOLIC BLOOD PRESSURE: 130 MMHG | HEIGHT: 55 IN

## 2020-09-17 DIAGNOSIS — R73.03 PREDIABETES: Primary | ICD-10-CM

## 2020-09-17 DIAGNOSIS — G47.09 OTHER INSOMNIA: ICD-10-CM

## 2020-09-17 DIAGNOSIS — R73.03 PREDIABETES: ICD-10-CM

## 2020-09-17 DIAGNOSIS — I10 ESSENTIAL HYPERTENSION: ICD-10-CM

## 2020-09-17 DIAGNOSIS — Z78.0 POSTMENOPAUSAL: ICD-10-CM

## 2020-09-17 PROBLEM — R05.9 COUGH: Status: RESOLVED | Noted: 2020-07-20 | Resolved: 2020-09-17

## 2020-09-17 PROBLEM — R53.83 FATIGUE: Status: RESOLVED | Noted: 2020-07-20 | Resolved: 2020-09-17

## 2020-09-17 LAB
ALBUMIN SERPL BCP-MCNC: 4.1 G/DL (ref 3.5–5.2)
ALP SERPL-CCNC: 63 U/L (ref 55–135)
ALT SERPL W/O P-5'-P-CCNC: 18 U/L (ref 10–44)
ANION GAP SERPL CALC-SCNC: 14 MMOL/L (ref 8–16)
AST SERPL-CCNC: 23 U/L (ref 10–40)
BASOPHILS # BLD AUTO: 0.08 K/UL (ref 0–0.2)
BASOPHILS NFR BLD: 1.3 % (ref 0–1.9)
BILIRUB SERPL-MCNC: 0.4 MG/DL (ref 0.1–1)
BUN SERPL-MCNC: 16 MG/DL (ref 8–23)
CALCIUM SERPL-MCNC: 9.4 MG/DL (ref 8.7–10.5)
CHLORIDE SERPL-SCNC: 99 MMOL/L (ref 95–110)
CO2 SERPL-SCNC: 26 MMOL/L (ref 23–29)
CREAT SERPL-MCNC: 0.8 MG/DL (ref 0.5–1.4)
DIFFERENTIAL METHOD: NORMAL
EOSINOPHIL # BLD AUTO: 0.1 K/UL (ref 0–0.5)
EOSINOPHIL NFR BLD: 2 % (ref 0–8)
ERYTHROCYTE [DISTWIDTH] IN BLOOD BY AUTOMATED COUNT: 13.2 % (ref 11.5–14.5)
EST. GFR  (AFRICAN AMERICAN): >60 ML/MIN/1.73 M^2
EST. GFR  (NON AFRICAN AMERICAN): >60 ML/MIN/1.73 M^2
GLUCOSE SERPL-MCNC: 103 MG/DL (ref 70–110)
HCT VFR BLD AUTO: 39.6 % (ref 37–48.5)
HGB BLD-MCNC: 13.2 G/DL (ref 12–16)
IMM GRANULOCYTES # BLD AUTO: 0.02 K/UL (ref 0–0.04)
IMM GRANULOCYTES NFR BLD AUTO: 0.3 % (ref 0–0.5)
LYMPHOCYTES # BLD AUTO: 1.3 K/UL (ref 1–4.8)
LYMPHOCYTES NFR BLD: 20.8 % (ref 18–48)
MCH RBC QN AUTO: 29.4 PG (ref 27–31)
MCHC RBC AUTO-ENTMCNC: 33.3 G/DL (ref 32–36)
MCV RBC AUTO: 88 FL (ref 82–98)
MONOCYTES # BLD AUTO: 0.6 K/UL (ref 0.3–1)
MONOCYTES NFR BLD: 10.1 % (ref 4–15)
NEUTROPHILS # BLD AUTO: 4 K/UL (ref 1.8–7.7)
NEUTROPHILS NFR BLD: 65.5 % (ref 38–73)
NRBC BLD-RTO: 0 /100 WBC
PLATELET # BLD AUTO: 253 K/UL (ref 150–350)
PMV BLD AUTO: 9.5 FL (ref 9.2–12.9)
POTASSIUM SERPL-SCNC: 3.8 MMOL/L (ref 3.5–5.1)
PROT SERPL-MCNC: 7.7 G/DL (ref 6–8.4)
RBC # BLD AUTO: 4.49 M/UL (ref 4–5.4)
SODIUM SERPL-SCNC: 139 MMOL/L (ref 136–145)
WBC # BLD AUTO: 6.14 K/UL (ref 3.9–12.7)

## 2020-09-17 PROCEDURE — 3078F DIAST BP <80 MM HG: CPT | Mod: CPTII,S$GLB,, | Performed by: FAMILY MEDICINE

## 2020-09-17 PROCEDURE — 3075F PR MOST RECENT SYSTOLIC BLOOD PRESS GE 130-139MM HG: ICD-10-PCS | Mod: CPTII,S$GLB,, | Performed by: FAMILY MEDICINE

## 2020-09-17 PROCEDURE — 85025 COMPLETE CBC W/AUTO DIFF WBC: CPT | Mod: PO

## 2020-09-17 PROCEDURE — 99999 PR PBB SHADOW E&M-EST. PATIENT-LVL IV: CPT | Mod: PBBFAC,,, | Performed by: FAMILY MEDICINE

## 2020-09-17 PROCEDURE — 99214 OFFICE O/P EST MOD 30 MIN: CPT | Mod: 25,S$GLB,, | Performed by: FAMILY MEDICINE

## 2020-09-17 PROCEDURE — 3075F SYST BP GE 130 - 139MM HG: CPT | Mod: CPTII,S$GLB,, | Performed by: FAMILY MEDICINE

## 2020-09-17 PROCEDURE — 36415 COLL VENOUS BLD VENIPUNCTURE: CPT | Mod: PO

## 2020-09-17 PROCEDURE — 1101F PT FALLS ASSESS-DOCD LE1/YR: CPT | Mod: CPTII,S$GLB,, | Performed by: FAMILY MEDICINE

## 2020-09-17 PROCEDURE — 99999 PR PBB SHADOW E&M-EST. PATIENT-LVL IV: ICD-10-PCS | Mod: PBBFAC,,, | Performed by: FAMILY MEDICINE

## 2020-09-17 PROCEDURE — 90694 VACC AIIV4 NO PRSRV 0.5ML IM: CPT | Mod: S$GLB,,, | Performed by: FAMILY MEDICINE

## 2020-09-17 PROCEDURE — 83036 HEMOGLOBIN GLYCOSYLATED A1C: CPT

## 2020-09-17 PROCEDURE — 1159F PR MEDICATION LIST DOCUMENTED IN MEDICAL RECORD: ICD-10-PCS | Mod: S$GLB,,, | Performed by: FAMILY MEDICINE

## 2020-09-17 PROCEDURE — 90694 FLU VACCINE - QUADRIVALENT - ADJUVANTED: ICD-10-PCS | Mod: S$GLB,,, | Performed by: FAMILY MEDICINE

## 2020-09-17 PROCEDURE — G0008 PR ADMIN INFLUENZA VIRUS VAC: ICD-10-PCS | Mod: S$GLB,,, | Performed by: FAMILY MEDICINE

## 2020-09-17 PROCEDURE — 99214 PR OFFICE/OUTPT VISIT, EST, LEVL IV, 30-39 MIN: ICD-10-PCS | Mod: 25,S$GLB,, | Performed by: FAMILY MEDICINE

## 2020-09-17 PROCEDURE — 3078F PR MOST RECENT DIASTOLIC BLOOD PRESSURE < 80 MM HG: ICD-10-PCS | Mod: CPTII,S$GLB,, | Performed by: FAMILY MEDICINE

## 2020-09-17 PROCEDURE — 1159F MED LIST DOCD IN RCRD: CPT | Mod: S$GLB,,, | Performed by: FAMILY MEDICINE

## 2020-09-17 PROCEDURE — 1125F AMNT PAIN NOTED PAIN PRSNT: CPT | Mod: S$GLB,,, | Performed by: FAMILY MEDICINE

## 2020-09-17 PROCEDURE — 80061 LIPID PANEL: CPT

## 2020-09-17 PROCEDURE — G0008 ADMIN INFLUENZA VIRUS VAC: HCPCS | Mod: S$GLB,,, | Performed by: FAMILY MEDICINE

## 2020-09-17 PROCEDURE — 1125F PR PAIN SEVERITY QUANTIFIED, PAIN PRESENT: ICD-10-PCS | Mod: S$GLB,,, | Performed by: FAMILY MEDICINE

## 2020-09-17 PROCEDURE — 80053 COMPREHEN METABOLIC PANEL: CPT | Mod: PO

## 2020-09-17 PROCEDURE — 1101F PR PT FALLS ASSESS DOC 0-1 FALLS W/OUT INJ PAST YR: ICD-10-PCS | Mod: CPTII,S$GLB,, | Performed by: FAMILY MEDICINE

## 2020-09-17 NOTE — ASSESSMENT & PLAN NOTE
On high risk medication of zolpidem however she has not had any issues in taking this chronically.  Continually need to reassess benefits versus risk of this medication.

## 2020-09-17 NOTE — PROGRESS NOTES
"Subjective:       Patient ID: Lacey Villafana is a 80 y.o. female.    Chief Complaint: Follow-up    HPI  Here today for routine follow-up on hypertension.  Due for flu vaccine and also due for DEXA scan.  No new complaints at this time.  Continues to have intermittent issues with right knee pain but this has been stable.  Has seen orthopedics for this problem.  Taking medications as listed below without any negative side effects.  Specifically, has not had any issues with zolpidem.    History reviewed. No pertinent family history.    Current Outpatient Medications:     aspirin (ECOTRIN) 81 MG EC tablet, Take 81 mg by mouth once daily., Disp: , Rfl:     glucosamine-chondroitin 500-400 mg tablet, Take 1 tablet by mouth 3 (three) times daily., Disp: , Rfl:     hydroCHLOROthiazide (HYDRODIURIL) 12.5 MG Tab, Take 1 tablet (12.5 mg total) by mouth once daily., Disp: 30 tablet, Rfl: 0    Lactobacillus rhamnosus GG (CULTURELLE) 10 billion cell capsule, Take 1 capsule by mouth once daily., Disp: , Rfl:     simvastatin (ZOCOR) 40 MG tablet, Take 1 tablet (40 mg total) by mouth every evening., Disp: 90 tablet, Rfl: 4    zolpidem (AMBIEN) 5 MG Tab, Take 1 tablet (5 mg total) by mouth nightly as needed., Disp: 90 tablet, Rfl: 0    Review of Systems   Constitutional: Negative for chills and fever.   Respiratory: Negative for cough and shortness of breath.    Cardiovascular: Negative for chest pain.   Gastrointestinal: Negative for abdominal pain.   Skin: Negative for rash.   Neurological: Negative for dizziness.       Objective:   BP (!) 130/58 (BP Location: Left arm, Patient Position: Sitting, BP Method: Medium (Manual))   Pulse 85   Temp 98.8 °F (37.1 °C) (Temporal)   Resp 16   Ht 4' 7" (1.397 m)   Wt 51.8 kg (114 lb 3.2 oz)   SpO2 96%   BMI 26.54 kg/m²      Physical Exam  Constitutional:       General: She is not in acute distress.     Appearance: She is well-developed. She is not diaphoretic.   HENT:      Head: " Normocephalic and atraumatic.      Nose: Nose normal.   Eyes:      General:         Right eye: No discharge.         Left eye: No discharge.      Conjunctiva/sclera: Conjunctivae normal.      Pupils: Pupils are equal, round, and reactive to light.   Neck:      Thyroid: No thyromegaly.   Cardiovascular:      Rate and Rhythm: Normal rate and regular rhythm.      Heart sounds: No murmur.   Pulmonary:      Effort: Pulmonary effort is normal. No respiratory distress.      Breath sounds: Normal breath sounds.   Abdominal:      General: There is no distension.      Palpations: Abdomen is soft.   Skin:     Findings: No rash.   Neurological:      Mental Status: She is alert and oriented to person, place, and time.   Psychiatric:         Behavior: Behavior normal.         Assessment & Plan     Problem List Items Addressed This Visit        Cardiac/Vascular    Essential hypertension (Chronic)    Current Assessment & Plan     Blood pressure well controlled.  Continue on same medication.         Relevant Orders    Lipid Panel    Comprehensive metabolic panel    CBC auto differential       Endocrine    Prediabetes - Primary    Current Assessment & Plan     Reassessing hemoglobin A1c today.         Relevant Orders    Hemoglobin A1C       Other    Other insomnia    Current Assessment & Plan     On high risk medication of zolpidem however she has not had any issues in taking this chronically.  Continually need to reassess benefits versus risk of this medication.           Other Visit Diagnoses     Postmenopausal        Relevant Orders    DXA Bone Density Spine And Hip            Follow up in about 6 months (around 3/17/2021).    Disclaimer:  This note may have been prepared using voice recognition software, it may have not been extensively proofed, as such there could be errors within the text such as sound alike errors.

## 2020-09-18 LAB
CHOLEST SERPL-MCNC: 157 MG/DL (ref 120–199)
CHOLEST/HDLC SERPL: 2.4 {RATIO} (ref 2–5)
ESTIMATED AVG GLUCOSE: 120 MG/DL (ref 68–131)
HBA1C MFR BLD HPLC: 5.8 % (ref 4–5.6)
HDLC SERPL-MCNC: 66 MG/DL (ref 40–75)
HDLC SERPL: 42 % (ref 20–50)
LDLC SERPL CALC-MCNC: 76 MG/DL (ref 63–159)
NONHDLC SERPL-MCNC: 91 MG/DL
TRIGL SERPL-MCNC: 75 MG/DL (ref 30–150)

## 2020-10-12 RX ORDER — ZOLPIDEM TARTRATE 5 MG/1
5 TABLET ORAL NIGHTLY PRN
Qty: 90 TABLET | Refills: 0 | Status: SHIPPED | OUTPATIENT
Start: 2020-10-12 | End: 2021-01-07

## 2020-10-12 NOTE — TELEPHONE ENCOUNTER
----- Message from Erendira Zelaya sent at 10/12/2020 12:04 PM CDT -----  Contact: pt  Type:  RX Refill Request    Who Called: Lacey  Refill or New Rx:refill  RX Name and Strength:zolpidem (AMBIEN) 5 MG Tab  How is the patient currently taking it? (ex. 1XDay):  Is this a 30 day or 90 day RX:  Preferred Pharmacy with phone number:  [facility] - [place] - [address]  [address] [address]  Phone: [phone] Fax: [phone]    [facility] - [facility] [address]  [address] [facility] [address]  Phone: [phone] Fax: [phone]    Local or Mail Order:local  Ordering Provider:Weeks  Would the patient rather a call back or a response via [facility]? call  Best Call Back Number:[phone]  Additional Information:

## 2020-10-27 ENCOUNTER — APPOINTMENT (OUTPATIENT)
Dept: RADIOLOGY | Facility: HOSPITAL | Age: 80
End: 2020-10-27
Attending: FAMILY MEDICINE
Payer: MEDICARE

## 2020-10-27 DIAGNOSIS — Z78.0 POSTMENOPAUSAL: ICD-10-CM

## 2020-10-27 PROCEDURE — 77080 DXA BONE DENSITY AXIAL: CPT | Mod: 26,,, | Performed by: RADIOLOGY

## 2020-10-27 PROCEDURE — 77080 DEXA BONE DENSITY SPINE HIP: ICD-10-PCS | Mod: 26,,, | Performed by: RADIOLOGY

## 2020-10-27 PROCEDURE — 77080 DXA BONE DENSITY AXIAL: CPT | Mod: TC

## 2020-11-10 ENCOUNTER — TELEPHONE (OUTPATIENT)
Dept: INTERNAL MEDICINE | Facility: CLINIC | Age: 80
End: 2020-11-10

## 2020-11-10 NOTE — TELEPHONE ENCOUNTER
----- Message from Eva Moreno sent at 11/10/2020 10:12 AM CST -----  Regarding: laxative  Type:  RX Refill Request    Who Called: pt  Refill or New Rx:refill  RX Name and Strength:laxative  How is the patient currently taking it? (ex. 1XDay):  Is this a 30 day or 90 day Rx:30 day  Preferred Pharmacy with phone number:  [facility] - [place], [place] - [address]  [address] [address]  Phone: [phone] Fax: [phone]  Local or Mail Order:local  Ordering Provider:Weeks  Would the patient rather a call back or a response via [facility]? Call back   Best Call Back Number:[phone]  Additional Information: Please call back.Thanks

## 2020-12-21 ENCOUNTER — TELEPHONE (OUTPATIENT)
Dept: INTERNAL MEDICINE | Facility: CLINIC | Age: 80
End: 2020-12-21

## 2020-12-21 NOTE — TELEPHONE ENCOUNTER
----- Message from Candy Littlejohn sent at 12/21/2020  9:07 AM CST -----  Contact: [phone]/SELF  Type:  Needs Medical Advice    Who Called:Patient  Symptoms (please be specific):    How long has patient had these symptoms:    Pharmacy name and phone #:    Would the patient rather a call back or a response via [facility]? Call Back  Best Call Back Number: [phone]  Additional Information: Patient would like to discuss the Covid19/Corona Virus Vaccine with nurse    Davie/ARELI

## 2021-03-18 ENCOUNTER — IMMUNIZATION (OUTPATIENT)
Dept: PRIMARY CARE CLINIC | Facility: CLINIC | Age: 81
End: 2021-03-18

## 2021-03-18 DIAGNOSIS — Z23 NEED FOR VACCINATION: Primary | ICD-10-CM

## 2021-03-18 PROCEDURE — 0011A COVID-19, MRNA, LNP-S, PF, 100 MCG/0.5 ML DOSE VACCINE: ICD-10-PCS | Mod: CV19,S$GLB,, | Performed by: FAMILY MEDICINE

## 2021-03-18 PROCEDURE — 0011A COVID-19, MRNA, LNP-S, PF, 100 MCG/0.5 ML DOSE VACCINE: CPT | Mod: CV19,S$GLB,, | Performed by: FAMILY MEDICINE

## 2021-03-18 PROCEDURE — 91301 COVID-19, MRNA, LNP-S, PF, 100 MCG/0.5 ML DOSE VACCINE: ICD-10-PCS | Mod: S$GLB,,, | Performed by: FAMILY MEDICINE

## 2021-03-18 PROCEDURE — 91301 COVID-19, MRNA, LNP-S, PF, 100 MCG/0.5 ML DOSE VACCINE: CPT | Mod: S$GLB,,, | Performed by: FAMILY MEDICINE

## 2021-04-15 ENCOUNTER — IMMUNIZATION (OUTPATIENT)
Dept: PRIMARY CARE CLINIC | Facility: CLINIC | Age: 81
End: 2021-04-15

## 2021-04-15 DIAGNOSIS — Z23 NEED FOR VACCINATION: Primary | ICD-10-CM

## 2021-04-15 PROCEDURE — 0012A COVID-19, MRNA, LNP-S, PF, 100 MCG/0.5 ML DOSE VACCINE: CPT | Mod: CV19,S$GLB,, | Performed by: FAMILY MEDICINE

## 2021-04-15 PROCEDURE — 91301 COVID-19, MRNA, LNP-S, PF, 100 MCG/0.5 ML DOSE VACCINE: ICD-10-PCS | Mod: S$GLB,,, | Performed by: FAMILY MEDICINE

## 2021-04-15 PROCEDURE — 0012A COVID-19, MRNA, LNP-S, PF, 100 MCG/0.5 ML DOSE VACCINE: ICD-10-PCS | Mod: CV19,S$GLB,, | Performed by: FAMILY MEDICINE

## 2021-04-15 PROCEDURE — 91301 COVID-19, MRNA, LNP-S, PF, 100 MCG/0.5 ML DOSE VACCINE: CPT | Mod: S$GLB,,, | Performed by: FAMILY MEDICINE

## 2021-06-03 RX ORDER — ZOLPIDEM TARTRATE 5 MG/1
5 TABLET ORAL NIGHTLY PRN
Qty: 90 TABLET | Refills: 0 | Status: SHIPPED | OUTPATIENT
Start: 2021-06-03 | End: 2021-11-15 | Stop reason: SDUPTHER

## 2021-07-01 ENCOUNTER — PATIENT MESSAGE (OUTPATIENT)
Dept: ADMINISTRATIVE | Facility: OTHER | Age: 81
End: 2021-07-01

## 2021-08-26 RX ORDER — LORATADINE 10 MG/1
10 TABLET ORAL DAILY
Qty: 30 TABLET | Refills: 0 | Status: SHIPPED | OUTPATIENT
Start: 2021-08-26 | End: 2022-11-07 | Stop reason: ALTCHOICE

## 2021-09-07 ENCOUNTER — TELEPHONE (OUTPATIENT)
Dept: INTERNAL MEDICINE | Facility: CLINIC | Age: 81
End: 2021-09-07

## 2021-09-20 ENCOUNTER — PATIENT OUTREACH (OUTPATIENT)
Dept: ADMINISTRATIVE | Facility: HOSPITAL | Age: 81
End: 2021-09-20

## 2021-09-20 ENCOUNTER — TELEPHONE (OUTPATIENT)
Dept: INTERNAL MEDICINE | Facility: CLINIC | Age: 81
End: 2021-09-20

## 2021-11-15 ENCOUNTER — LAB VISIT (OUTPATIENT)
Dept: LAB | Facility: HOSPITAL | Age: 81
End: 2021-11-15
Attending: FAMILY MEDICINE
Payer: MEDICARE

## 2021-11-15 ENCOUNTER — OFFICE VISIT (OUTPATIENT)
Dept: INTERNAL MEDICINE | Facility: CLINIC | Age: 81
End: 2021-11-15
Payer: MEDICARE

## 2021-11-15 VITALS
OXYGEN SATURATION: 97 % | TEMPERATURE: 98 F | RESPIRATION RATE: 16 BRPM | BODY MASS INDEX: 26.68 KG/M2 | HEIGHT: 55 IN | DIASTOLIC BLOOD PRESSURE: 58 MMHG | HEART RATE: 85 BPM | WEIGHT: 115.31 LBS | SYSTOLIC BLOOD PRESSURE: 126 MMHG

## 2021-11-15 DIAGNOSIS — I10 ESSENTIAL HYPERTENSION: Chronic | ICD-10-CM

## 2021-11-15 DIAGNOSIS — R73.03 PREDIABETES: ICD-10-CM

## 2021-11-15 DIAGNOSIS — I10 ESSENTIAL HYPERTENSION: Primary | Chronic | ICD-10-CM

## 2021-11-15 DIAGNOSIS — G47.09 OTHER INSOMNIA: ICD-10-CM

## 2021-11-15 PROBLEM — R60.1 GENERALIZED EDEMA: Status: RESOLVED | Noted: 2020-06-16 | Resolved: 2021-11-15

## 2021-11-15 LAB
ALBUMIN SERPL BCP-MCNC: 4 G/DL (ref 3.5–5.2)
ALP SERPL-CCNC: 70 U/L (ref 55–135)
ALT SERPL W/O P-5'-P-CCNC: 12 U/L (ref 10–44)
ANION GAP SERPL CALC-SCNC: 12 MMOL/L (ref 8–16)
AST SERPL-CCNC: 21 U/L (ref 10–40)
BILIRUB SERPL-MCNC: 0.4 MG/DL (ref 0.1–1)
BUN SERPL-MCNC: 25 MG/DL (ref 8–23)
CALCIUM SERPL-MCNC: 9.7 MG/DL (ref 8.7–10.5)
CHLORIDE SERPL-SCNC: 101 MMOL/L (ref 95–110)
CO2 SERPL-SCNC: 27 MMOL/L (ref 23–29)
CREAT SERPL-MCNC: 0.8 MG/DL (ref 0.5–1.4)
EST. GFR  (AFRICAN AMERICAN): >60 ML/MIN/1.73 M^2
EST. GFR  (NON AFRICAN AMERICAN): >60 ML/MIN/1.73 M^2
GLUCOSE SERPL-MCNC: 94 MG/DL (ref 70–110)
POTASSIUM SERPL-SCNC: 4 MMOL/L (ref 3.5–5.1)
PROT SERPL-MCNC: 7.7 G/DL (ref 6–8.4)
SODIUM SERPL-SCNC: 140 MMOL/L (ref 136–145)

## 2021-11-15 PROCEDURE — 3078F DIAST BP <80 MM HG: CPT | Mod: HCNC,CPTII,S$GLB, | Performed by: FAMILY MEDICINE

## 2021-11-15 PROCEDURE — 3288F PR FALLS RISK ASSESSMENT DOCUMENTED: ICD-10-PCS | Mod: HCNC,CPTII,S$GLB, | Performed by: FAMILY MEDICINE

## 2021-11-15 PROCEDURE — 36415 COLL VENOUS BLD VENIPUNCTURE: CPT | Mod: HCNC,PO | Performed by: FAMILY MEDICINE

## 2021-11-15 PROCEDURE — 3288F FALL RISK ASSESSMENT DOCD: CPT | Mod: HCNC,CPTII,S$GLB, | Performed by: FAMILY MEDICINE

## 2021-11-15 PROCEDURE — 99214 OFFICE O/P EST MOD 30 MIN: CPT | Mod: HCNC,S$GLB,, | Performed by: FAMILY MEDICINE

## 2021-11-15 PROCEDURE — 1126F AMNT PAIN NOTED NONE PRSNT: CPT | Mod: HCNC,CPTII,S$GLB, | Performed by: FAMILY MEDICINE

## 2021-11-15 PROCEDURE — 1157F PR ADVANCE CARE PLAN OR EQUIV PRESENT IN MEDICAL RECORD: ICD-10-PCS | Mod: HCNC,CPTII,S$GLB, | Performed by: FAMILY MEDICINE

## 2021-11-15 PROCEDURE — 99499 UNLISTED E&M SERVICE: CPT | Mod: S$GLB,,, | Performed by: FAMILY MEDICINE

## 2021-11-15 PROCEDURE — 1157F ADVNC CARE PLAN IN RCRD: CPT | Mod: HCNC,CPTII,S$GLB, | Performed by: FAMILY MEDICINE

## 2021-11-15 PROCEDURE — 3078F PR MOST RECENT DIASTOLIC BLOOD PRESSURE < 80 MM HG: ICD-10-PCS | Mod: HCNC,CPTII,S$GLB, | Performed by: FAMILY MEDICINE

## 2021-11-15 PROCEDURE — 83036 HEMOGLOBIN GLYCOSYLATED A1C: CPT | Mod: HCNC | Performed by: FAMILY MEDICINE

## 2021-11-15 PROCEDURE — 1126F PR PAIN SEVERITY QUANTIFIED, NO PAIN PRESENT: ICD-10-PCS | Mod: HCNC,CPTII,S$GLB, | Performed by: FAMILY MEDICINE

## 2021-11-15 PROCEDURE — 99214 PR OFFICE/OUTPT VISIT, EST, LEVL IV, 30-39 MIN: ICD-10-PCS | Mod: HCNC,S$GLB,, | Performed by: FAMILY MEDICINE

## 2021-11-15 PROCEDURE — 99499 RISK ADDL DX/OHS AUDIT: ICD-10-PCS | Mod: S$GLB,,, | Performed by: FAMILY MEDICINE

## 2021-11-15 PROCEDURE — 1159F MED LIST DOCD IN RCRD: CPT | Mod: HCNC,CPTII,S$GLB, | Performed by: FAMILY MEDICINE

## 2021-11-15 PROCEDURE — 3074F PR MOST RECENT SYSTOLIC BLOOD PRESSURE < 130 MM HG: ICD-10-PCS | Mod: HCNC,CPTII,S$GLB, | Performed by: FAMILY MEDICINE

## 2021-11-15 PROCEDURE — 1101F PR PT FALLS ASSESS DOC 0-1 FALLS W/OUT INJ PAST YR: ICD-10-PCS | Mod: HCNC,CPTII,S$GLB, | Performed by: FAMILY MEDICINE

## 2021-11-15 PROCEDURE — 3074F SYST BP LT 130 MM HG: CPT | Mod: HCNC,CPTII,S$GLB, | Performed by: FAMILY MEDICINE

## 2021-11-15 PROCEDURE — 99999 PR PBB SHADOW E&M-EST. PATIENT-LVL IV: ICD-10-PCS | Mod: PBBFAC,HCNC,, | Performed by: FAMILY MEDICINE

## 2021-11-15 PROCEDURE — 1101F PT FALLS ASSESS-DOCD LE1/YR: CPT | Mod: HCNC,CPTII,S$GLB, | Performed by: FAMILY MEDICINE

## 2021-11-15 PROCEDURE — 99999 PR PBB SHADOW E&M-EST. PATIENT-LVL IV: CPT | Mod: PBBFAC,HCNC,, | Performed by: FAMILY MEDICINE

## 2021-11-15 PROCEDURE — 80053 COMPREHEN METABOLIC PANEL: CPT | Mod: HCNC,PO | Performed by: FAMILY MEDICINE

## 2021-11-15 PROCEDURE — 1159F PR MEDICATION LIST DOCUMENTED IN MEDICAL RECORD: ICD-10-PCS | Mod: HCNC,CPTII,S$GLB, | Performed by: FAMILY MEDICINE

## 2021-11-15 RX ORDER — AMLODIPINE BESYLATE 5 MG/1
5 TABLET ORAL DAILY
Qty: 90 TABLET | Refills: 3 | Status: SHIPPED | OUTPATIENT
Start: 2021-11-15 | End: 2022-04-18 | Stop reason: SDUPTHER

## 2021-11-15 RX ORDER — HYDROCHLOROTHIAZIDE 12.5 MG/1
12.5 TABLET ORAL DAILY
Qty: 90 TABLET | Refills: 3 | Status: SHIPPED | OUTPATIENT
Start: 2021-11-15 | End: 2022-04-18 | Stop reason: SDUPTHER

## 2021-11-15 RX ORDER — ZOLPIDEM TARTRATE 5 MG/1
5 TABLET ORAL NIGHTLY PRN
Qty: 90 TABLET | Refills: 0 | Status: SHIPPED | OUTPATIENT
Start: 2021-11-15 | End: 2022-05-25 | Stop reason: SDUPTHER

## 2021-11-15 RX ORDER — ZOLPIDEM TARTRATE 5 MG/1
5 TABLET ORAL NIGHTLY PRN
Qty: 30 TABLET | Refills: 0 | Status: SHIPPED | OUTPATIENT
Start: 2021-11-15 | End: 2021-11-15 | Stop reason: SDUPTHER

## 2021-11-16 ENCOUNTER — TELEPHONE (OUTPATIENT)
Dept: INTERNAL MEDICINE | Facility: CLINIC | Age: 81
End: 2021-11-16
Payer: MEDICARE

## 2021-11-16 LAB
ESTIMATED AVG GLUCOSE: 114 MG/DL (ref 68–131)
HBA1C MFR BLD: 5.6 % (ref 4–5.6)

## 2022-04-07 RX ORDER — ZOLPIDEM TARTRATE 5 MG/1
5 TABLET ORAL NIGHTLY PRN
Qty: 90 TABLET | Refills: 0 | OUTPATIENT
Start: 2022-04-07

## 2022-04-07 NOTE — TELEPHONE ENCOUNTER
----- Message from Dora Blue sent at 4/7/2022  8:56 AM CDT -----  Type:  RX Refill Request    Who Called: pt  Refill or New Rx:refill  RX Name and Strength:zolpidem 5 MG  How is the patient currently taking it? (ex. 1XDay):1/2 pill at night  Is this a 30 day or 90 day RX:?  Preferred Pharmacy with phone number:.  [facility] - [place] - [address]  [address] [address]  Phone: [phone] Fax: [phone]  Local or Mail Order:local  Ordering Provider:Dr Davis  Would the patient rather a call back or a response via [facility]? call  Best Call Back Number:[phone]   Additional Information: .    Thank you

## 2022-04-08 ENCOUNTER — TELEPHONE (OUTPATIENT)
Dept: FAMILY MEDICINE | Facility: CLINIC | Age: 82
End: 2022-04-08
Payer: MEDICARE

## 2022-04-08 NOTE — TELEPHONE ENCOUNTER
I'm sorry I misunderstood. Since this refill is for ambien, it will need to be in person with someone in [place]

## 2022-04-08 NOTE — TELEPHONE ENCOUNTER
----- Message from Scarlet Pappas sent at 4/8/2022  7:08 AM CDT -----  Regarding: Medication Refill  Pt has been informed that she would need an appointment in order to get a refill.Pt states she doesn't have transportation to visit the NP in [facility] so she agreed to scheduling an audio virtual with you.

## 2022-04-11 ENCOUNTER — TELEPHONE (OUTPATIENT)
Dept: INTERNAL MEDICINE | Facility: CLINIC | Age: 82
End: 2022-04-11
Payer: MEDICARE

## 2022-04-11 NOTE — TELEPHONE ENCOUNTER
----- Message from Rose Mary Zamora sent at 4/11/2022  1:38 PM CDT -----  Contact: [phone] Patient  Requesting an RX refill or new RX.  Is this a refill or new RX: new  RX name and strength (copy/paste from chart):  zolpidem (AMBIEN) 5 MG Tab  Is this a 30 day or 90 day RX:   Pharmacy name and phone # (copy/paste from chart):    [facility] - [place] - [address]  [address] [address]  Phone: [phone] Fax: [phone]    Pt states she has enough medication for 2 days

## 2022-04-18 ENCOUNTER — OFFICE VISIT (OUTPATIENT)
Dept: INTERNAL MEDICINE | Facility: CLINIC | Age: 82
End: 2022-04-18
Payer: MEDICARE

## 2022-04-18 VITALS
TEMPERATURE: 98 F | WEIGHT: 111.31 LBS | HEART RATE: 76 BPM | DIASTOLIC BLOOD PRESSURE: 64 MMHG | HEIGHT: 55 IN | SYSTOLIC BLOOD PRESSURE: 112 MMHG | OXYGEN SATURATION: 97 % | BODY MASS INDEX: 25.76 KG/M2

## 2022-04-18 DIAGNOSIS — R73.03 PREDIABETES: ICD-10-CM

## 2022-04-18 DIAGNOSIS — I10 ESSENTIAL HYPERTENSION: Primary | ICD-10-CM

## 2022-04-18 DIAGNOSIS — G47.09 OTHER INSOMNIA: ICD-10-CM

## 2022-04-18 PROCEDURE — 99999 PR PBB SHADOW E&M-EST. PATIENT-LVL IV: CPT | Mod: PBBFAC,,, | Performed by: NURSE PRACTITIONER

## 2022-04-18 PROCEDURE — 99213 OFFICE O/P EST LOW 20 MIN: CPT | Mod: S$GLB,,, | Performed by: NURSE PRACTITIONER

## 2022-04-18 PROCEDURE — 99999 PR PBB SHADOW E&M-EST. PATIENT-LVL IV: ICD-10-PCS | Mod: PBBFAC,,, | Performed by: NURSE PRACTITIONER

## 2022-04-18 PROCEDURE — 1126F PR PAIN SEVERITY QUANTIFIED, NO PAIN PRESENT: ICD-10-PCS | Mod: CPTII,S$GLB,, | Performed by: NURSE PRACTITIONER

## 2022-04-18 PROCEDURE — 3288F PR FALLS RISK ASSESSMENT DOCUMENTED: ICD-10-PCS | Mod: CPTII,S$GLB,, | Performed by: NURSE PRACTITIONER

## 2022-04-18 PROCEDURE — 1101F PR PT FALLS ASSESS DOC 0-1 FALLS W/OUT INJ PAST YR: ICD-10-PCS | Mod: CPTII,S$GLB,, | Performed by: NURSE PRACTITIONER

## 2022-04-18 PROCEDURE — 3288F FALL RISK ASSESSMENT DOCD: CPT | Mod: CPTII,S$GLB,, | Performed by: NURSE PRACTITIONER

## 2022-04-18 PROCEDURE — 1126F AMNT PAIN NOTED NONE PRSNT: CPT | Mod: CPTII,S$GLB,, | Performed by: NURSE PRACTITIONER

## 2022-04-18 PROCEDURE — 1160F PR REVIEW ALL MEDS BY PRESCRIBER/CLIN PHARMACIST DOCUMENTED: ICD-10-PCS | Mod: CPTII,S$GLB,, | Performed by: NURSE PRACTITIONER

## 2022-04-18 PROCEDURE — 1159F PR MEDICATION LIST DOCUMENTED IN MEDICAL RECORD: ICD-10-PCS | Mod: CPTII,S$GLB,, | Performed by: NURSE PRACTITIONER

## 2022-04-18 PROCEDURE — 1160F RVW MEDS BY RX/DR IN RCRD: CPT | Mod: CPTII,S$GLB,, | Performed by: NURSE PRACTITIONER

## 2022-04-18 PROCEDURE — 99213 PR OFFICE/OUTPT VISIT, EST, LEVL III, 20-29 MIN: ICD-10-PCS | Mod: S$GLB,,, | Performed by: NURSE PRACTITIONER

## 2022-04-18 PROCEDURE — 1157F PR ADVANCE CARE PLAN OR EQUIV PRESENT IN MEDICAL RECORD: ICD-10-PCS | Mod: CPTII,S$GLB,, | Performed by: NURSE PRACTITIONER

## 2022-04-18 PROCEDURE — 3078F DIAST BP <80 MM HG: CPT | Mod: CPTII,S$GLB,, | Performed by: NURSE PRACTITIONER

## 2022-04-18 PROCEDURE — 3078F PR MOST RECENT DIASTOLIC BLOOD PRESSURE < 80 MM HG: ICD-10-PCS | Mod: CPTII,S$GLB,, | Performed by: NURSE PRACTITIONER

## 2022-04-18 PROCEDURE — 3074F SYST BP LT 130 MM HG: CPT | Mod: CPTII,S$GLB,, | Performed by: NURSE PRACTITIONER

## 2022-04-18 PROCEDURE — 1159F MED LIST DOCD IN RCRD: CPT | Mod: CPTII,S$GLB,, | Performed by: NURSE PRACTITIONER

## 2022-04-18 PROCEDURE — 1101F PT FALLS ASSESS-DOCD LE1/YR: CPT | Mod: CPTII,S$GLB,, | Performed by: NURSE PRACTITIONER

## 2022-04-18 PROCEDURE — 3074F PR MOST RECENT SYSTOLIC BLOOD PRESSURE < 130 MM HG: ICD-10-PCS | Mod: CPTII,S$GLB,, | Performed by: NURSE PRACTITIONER

## 2022-04-18 PROCEDURE — 1157F ADVNC CARE PLAN IN RCRD: CPT | Mod: CPTII,S$GLB,, | Performed by: NURSE PRACTITIONER

## 2022-04-18 RX ORDER — HYDROCHLOROTHIAZIDE 12.5 MG/1
12.5 TABLET ORAL DAILY
Qty: 90 TABLET | Refills: 3 | Status: SHIPPED | OUTPATIENT
Start: 2022-04-18 | End: 2022-07-11 | Stop reason: SDUPTHER

## 2022-04-18 RX ORDER — AMLODIPINE BESYLATE 5 MG/1
5 TABLET ORAL DAILY
Qty: 90 TABLET | Refills: 3 | Status: SHIPPED | OUTPATIENT
Start: 2022-04-18 | End: 2022-11-07 | Stop reason: SDUPTHER

## 2022-04-18 RX ORDER — SIMVASTATIN 40 MG/1
40 TABLET, FILM COATED ORAL NIGHTLY
Qty: 90 TABLET | Refills: 3 | Status: SHIPPED | OUTPATIENT
Start: 2022-04-18 | End: 2022-07-11

## 2022-04-18 NOTE — PROGRESS NOTES
Subjective:       Patient ID: Lacey Villafana is a 81 y.o. female.    Chief Complaint: Hypertension and Medication Refill    Mrs. Villafana presents to visit for HTN follow up. She has no acute complaints today. Needs refills on medications. Labs reviewed.     Hypertension  This is a chronic problem. The current episode started more than 1 year ago. The problem has been waxing and waning since onset. Pertinent negatives include no anxiety, blurred vision, chest pain, headaches, malaise/fatigue, orthopnea, palpitations, peripheral edema, shortness of breath or sweats. There are no associated agents to hypertension. Risk factors for coronary artery disease include sedentary lifestyle. Past treatments include calcium channel blockers and diuretics. The current treatment provides significant improvement. There are no compliance problems.  There is no history of angina, kidney disease, CAD/MI, CVA, heart failure, left ventricular hypertrophy, PVD or retinopathy.       Patient Active Problem List   Diagnosis    Other insomnia    Essential hypertension    Prediabetes       Family History   Problem Relation Age of Onset    No Known Problems Mother     No Known Problems Father      Past Surgical History:   Procedure Laterality Date    CHOLECYSTECTOMY      HEMORRHOID SURGERY      HYSTERECTOMY      OOPHORECTOMY           Current Outpatient Medications:     aspirin (ECOTRIN) 81 MG EC tablet, Take 81 mg by mouth once daily., Disp: , Rfl:     beta-carotene,A,-vits C,E/mins (OCUVITE ORAL), Take 1 tablet by mouth once daily., Disp: , Rfl:     COD LIVER OIL ORAL, Take 1 tablet by mouth once daily., Disp: , Rfl:     FLAXSEED OIL ORAL, Take 1 tablet by mouth once daily., Disp: , Rfl:     glucosamine-chondroitin 500-400 mg tablet, Take 1 tablet by mouth 3 (three) times daily., Disp: , Rfl:     Lactobacillus rhamnosus GG (CULTURELLE) 10 billion cell capsule, Take 1 capsule by mouth once daily., Disp: , Rfl:      "loratadine (CLARITIN) 10 mg tablet, Take 1 tablet (10 mg total) by mouth once daily., Disp: 30 tablet, Rfl: 0    psyllium seed, with sugar, (FIBER ORAL), Take 1 capsule by mouth once daily., Disp: , Rfl:     zolpidem (AMBIEN) 5 MG Tab, Take 1 tablet (5 mg total) by mouth nightly as needed., Disp: 90 tablet, Rfl: 0    amLODIPine (NORVASC) 5 MG tablet, Take 1 tablet (5 mg total) by mouth once daily., Disp: 90 tablet, Rfl: 3    hydroCHLOROthiazide (HYDRODIURIL) 12.5 MG Tab, Take 1 tablet (12.5 mg total) by mouth once daily., Disp: 90 tablet, Rfl: 3    simvastatin (ZOCOR) 40 MG tablet, Take 1 tablet (40 mg total) by mouth every evening., Disp: 90 tablet, Rfl: 3    Review of Systems   Constitutional: Negative for activity change, appetite change, chills, fatigue, fever and malaise/fatigue.   Eyes: Negative for blurred vision, photophobia and visual disturbance.   Respiratory: Negative for cough, chest tightness and shortness of breath.    Cardiovascular: Negative for chest pain, palpitations, orthopnea and leg swelling.   Gastrointestinal: Negative for abdominal pain, blood in stool, constipation, diarrhea, nausea and vomiting.   Endocrine: Negative for polydipsia, polyphagia and polyuria.   Genitourinary: Negative for dysuria, frequency, hematuria and urgency.   Musculoskeletal: Positive for arthralgias. Negative for gait problem.   Neurological: Negative for dizziness, tremors, syncope, light-headedness and headaches.   Psychiatric/Behavioral: Negative for agitation, dysphoric mood and sleep disturbance. The patient is not nervous/anxious.        Objective:   /64 (BP Location: Left arm, Patient Position: Sitting, BP Method: Medium (Manual))   Pulse 76   Temp 98.2 °F (36.8 °C) (Temporal)   Ht 4' 7" (1.397 m)   Wt 50.5 kg (111 lb 5.3 oz)   SpO2 97%   BMI 25.88 kg/m²      Physical Exam  Constitutional:       General: She is not in acute distress.     Appearance: Normal appearance. She is not " ill-appearing.   HENT:      Head: Normocephalic.   Cardiovascular:      Rate and Rhythm: Normal rate and regular rhythm.      Pulses: Normal pulses.      Heart sounds: Normal heart sounds. No murmur heard.    No friction rub. No gallop.   Pulmonary:      Effort: Pulmonary effort is normal. No respiratory distress.      Breath sounds: Normal breath sounds. No wheezing.   Abdominal:      General: There is no distension.      Palpations: Abdomen is soft.      Tenderness: There is no abdominal tenderness. There is no right CVA tenderness, left CVA tenderness or guarding.   Musculoskeletal:         General: Normal range of motion.      Cervical back: Normal range of motion and neck supple. No tenderness.      Right lower leg: No edema.      Left lower leg: No edema.   Lymphadenopathy:      Cervical: No cervical adenopathy.   Skin:     General: Skin is warm and dry.      Coloration: Skin is not pale.      Findings: No erythema.   Neurological:      General: No focal deficit present.      Mental Status: She is alert and oriented to person, place, and time.   Psychiatric:         Mood and Affect: Mood normal.         Behavior: Behavior normal.         Assessment & Plan     Problem List Items Addressed This Visit        Cardiac/Vascular    Essential hypertension - Primary (Chronic)    Current Assessment & Plan     Blood pressure well controlled. Continue current medications. Repeat labs in 6-7 months.            Relevant Medications    amLODIPine (NORVASC) 5 MG tablet    hydroCHLOROthiazide (HYDRODIURIL) 12.5 MG Tab    simvastatin (ZOCOR) 40 MG tablet       Endocrine    Prediabetes    Current Assessment & Plan     Last a1c within normal range. Continue lifestyle modifications.               Other    Other insomnia    Current Assessment & Plan     Stable on low dose ambien. Only takes 0.5 tablet nightly prn.  reviewed. Continue current dose.                Follow up in about 7 months (around  "11/18/2022).            Portions of this note may have been created with voice recognition software. Occasional "wrong-word" or "sound-a-like" substitutions may have occurred due to the inherent limitations of voice recognition software. Please, read the note carefully and recognize, using context, where substitutions have occurred.       "

## 2022-05-25 RX ORDER — ZOLPIDEM TARTRATE 5 MG/1
5 TABLET ORAL NIGHTLY PRN
Qty: 90 TABLET | Refills: 0 | Status: SHIPPED | OUTPATIENT
Start: 2022-05-25 | End: 2022-11-07 | Stop reason: SDUPTHER

## 2022-05-25 NOTE — TELEPHONE ENCOUNTER
----- Message from Courtney Ling sent at 5/25/2022 11:38 AM CDT -----  Contact: Patient, [phone]  Requesting an RX refill or new RX.  Is this a refill or new RX: Refill  RX name and strength (copy/paste from chart):  zolpidem (AMBIEN) 5 MG Tab  Is this a 30 day or 90 day RX: 30  Pharmacy name and phone # (copy/paste from chart):    [facility] - [place], [place] - [address]  [address] [address]  Phone: [phone] Fax: [phone]  The doctors have asked that we provide their patients with the following 2 reminders -- prescription refills can take up to 72 hours, and a friendly reminder that in the future you can use your [facility] account to request refills: Yes

## 2022-07-11 DIAGNOSIS — I10 ESSENTIAL HYPERTENSION: ICD-10-CM

## 2022-07-11 RX ORDER — HYDROCHLOROTHIAZIDE 12.5 MG/1
12.5 TABLET ORAL DAILY
Qty: 90 TABLET | Refills: 3 | Status: SHIPPED | OUTPATIENT
Start: 2022-07-11 | End: 2023-03-27 | Stop reason: SDUPTHER

## 2022-09-19 ENCOUNTER — PATIENT OUTREACH (OUTPATIENT)
Dept: ADMINISTRATIVE | Facility: HOSPITAL | Age: 82
End: 2022-09-19
Payer: MEDICARE

## 2022-10-27 RX ORDER — ZOLPIDEM TARTRATE 5 MG/1
5 TABLET ORAL NIGHTLY PRN
Qty: 90 TABLET | Refills: 0 | OUTPATIENT
Start: 2022-10-27

## 2022-10-27 NOTE — TELEPHONE ENCOUNTER
----- Message from Iraida Rainey sent at 10/27/2022 12:42 PM CDT -----  Contact: Lacey  Type:  RX Refill Request    Who Called: Lacey  Refill or New Rx:refill  RX Name and Strength:zolpidem (AMBIEN) 5 MG Tab  How is the patient currently taking it? (ex. 1XDay):as prescribed  Is this a 30 day or 90 day RX:90  Preferred Pharmacy with phone number:  [facility] - [place] - [address]  [address] [address]  Phone: [phone] Fax: [phone]  Local or Mail Order:local  Ordering Provider:Amy Dodd  Would the patient rather a call back or a response via [facility]? call  Best Call Back Number:[phone]   Additional Information: Please give patient a call back to notify.   Thank you,  GH

## 2022-11-07 ENCOUNTER — OFFICE VISIT (OUTPATIENT)
Dept: INTERNAL MEDICINE | Facility: CLINIC | Age: 82
End: 2022-11-07
Payer: MEDICARE

## 2022-11-07 ENCOUNTER — LAB VISIT (OUTPATIENT)
Dept: LAB | Facility: HOSPITAL | Age: 82
End: 2022-11-07
Attending: NURSE PRACTITIONER
Payer: MEDICARE

## 2022-11-07 VITALS
TEMPERATURE: 98 F | WEIGHT: 112.44 LBS | HEIGHT: 55 IN | HEART RATE: 79 BPM | BODY MASS INDEX: 26.02 KG/M2 | OXYGEN SATURATION: 98 % | DIASTOLIC BLOOD PRESSURE: 70 MMHG | SYSTOLIC BLOOD PRESSURE: 160 MMHG

## 2022-11-07 DIAGNOSIS — R73.03 PREDIABETES: ICD-10-CM

## 2022-11-07 DIAGNOSIS — I10 ESSENTIAL HYPERTENSION: Chronic | ICD-10-CM

## 2022-11-07 DIAGNOSIS — G47.09 OTHER INSOMNIA: ICD-10-CM

## 2022-11-07 DIAGNOSIS — I10 ESSENTIAL HYPERTENSION: Primary | Chronic | ICD-10-CM

## 2022-11-07 LAB
ALBUMIN SERPL BCP-MCNC: 3.9 G/DL (ref 3.5–5.2)
ALP SERPL-CCNC: 64 U/L (ref 55–135)
ALT SERPL W/O P-5'-P-CCNC: 11 U/L (ref 10–44)
ANION GAP SERPL CALC-SCNC: 10 MMOL/L (ref 8–16)
AST SERPL-CCNC: 19 U/L (ref 10–40)
BILIRUB SERPL-MCNC: 0.4 MG/DL (ref 0.1–1)
BUN SERPL-MCNC: 23 MG/DL (ref 8–23)
CALCIUM SERPL-MCNC: 9.6 MG/DL (ref 8.7–10.5)
CHLORIDE SERPL-SCNC: 105 MMOL/L (ref 95–110)
CO2 SERPL-SCNC: 27 MMOL/L (ref 23–29)
CREAT SERPL-MCNC: 0.8 MG/DL (ref 0.5–1.4)
EST. GFR  (NO RACE VARIABLE): >60 ML/MIN/1.73 M^2
GLUCOSE SERPL-MCNC: 99 MG/DL (ref 70–110)
POTASSIUM SERPL-SCNC: 4 MMOL/L (ref 3.5–5.1)
PROT SERPL-MCNC: 7.6 G/DL (ref 6–8.4)
SODIUM SERPL-SCNC: 142 MMOL/L (ref 136–145)

## 2022-11-07 PROCEDURE — 1160F RVW MEDS BY RX/DR IN RCRD: CPT | Mod: CPTII,S$GLB,, | Performed by: NURSE PRACTITIONER

## 2022-11-07 PROCEDURE — 1126F AMNT PAIN NOTED NONE PRSNT: CPT | Mod: CPTII,S$GLB,, | Performed by: NURSE PRACTITIONER

## 2022-11-07 PROCEDURE — 3077F SYST BP >= 140 MM HG: CPT | Mod: CPTII,S$GLB,, | Performed by: NURSE PRACTITIONER

## 2022-11-07 PROCEDURE — 3288F FALL RISK ASSESSMENT DOCD: CPT | Mod: CPTII,S$GLB,, | Performed by: NURSE PRACTITIONER

## 2022-11-07 PROCEDURE — 1159F MED LIST DOCD IN RCRD: CPT | Mod: CPTII,S$GLB,, | Performed by: NURSE PRACTITIONER

## 2022-11-07 PROCEDURE — 80061 LIPID PANEL: CPT | Performed by: NURSE PRACTITIONER

## 2022-11-07 PROCEDURE — 99999 PR PBB SHADOW E&M-EST. PATIENT-LVL IV: ICD-10-PCS | Mod: PBBFAC,,, | Performed by: NURSE PRACTITIONER

## 2022-11-07 PROCEDURE — 36415 COLL VENOUS BLD VENIPUNCTURE: CPT | Mod: PO | Performed by: NURSE PRACTITIONER

## 2022-11-07 PROCEDURE — 1157F PR ADVANCE CARE PLAN OR EQUIV PRESENT IN MEDICAL RECORD: ICD-10-PCS | Mod: CPTII,S$GLB,, | Performed by: NURSE PRACTITIONER

## 2022-11-07 PROCEDURE — 99214 PR OFFICE/OUTPT VISIT, EST, LEVL IV, 30-39 MIN: ICD-10-PCS | Mod: S$GLB,,, | Performed by: NURSE PRACTITIONER

## 2022-11-07 PROCEDURE — 1126F PR PAIN SEVERITY QUANTIFIED, NO PAIN PRESENT: ICD-10-PCS | Mod: CPTII,S$GLB,, | Performed by: NURSE PRACTITIONER

## 2022-11-07 PROCEDURE — 3078F DIAST BP <80 MM HG: CPT | Mod: CPTII,S$GLB,, | Performed by: NURSE PRACTITIONER

## 2022-11-07 PROCEDURE — 3078F PR MOST RECENT DIASTOLIC BLOOD PRESSURE < 80 MM HG: ICD-10-PCS | Mod: CPTII,S$GLB,, | Performed by: NURSE PRACTITIONER

## 2022-11-07 PROCEDURE — 99214 OFFICE O/P EST MOD 30 MIN: CPT | Mod: S$GLB,,, | Performed by: NURSE PRACTITIONER

## 2022-11-07 PROCEDURE — 83036 HEMOGLOBIN GLYCOSYLATED A1C: CPT | Performed by: NURSE PRACTITIONER

## 2022-11-07 PROCEDURE — 1160F PR REVIEW ALL MEDS BY PRESCRIBER/CLIN PHARMACIST DOCUMENTED: ICD-10-PCS | Mod: CPTII,S$GLB,, | Performed by: NURSE PRACTITIONER

## 2022-11-07 PROCEDURE — 99999 PR PBB SHADOW E&M-EST. PATIENT-LVL IV: CPT | Mod: PBBFAC,,, | Performed by: NURSE PRACTITIONER

## 2022-11-07 PROCEDURE — 1101F PT FALLS ASSESS-DOCD LE1/YR: CPT | Mod: CPTII,S$GLB,, | Performed by: NURSE PRACTITIONER

## 2022-11-07 PROCEDURE — 1101F PR PT FALLS ASSESS DOC 0-1 FALLS W/OUT INJ PAST YR: ICD-10-PCS | Mod: CPTII,S$GLB,, | Performed by: NURSE PRACTITIONER

## 2022-11-07 PROCEDURE — 80053 COMPREHEN METABOLIC PANEL: CPT | Mod: PO | Performed by: NURSE PRACTITIONER

## 2022-11-07 PROCEDURE — 3077F PR MOST RECENT SYSTOLIC BLOOD PRESSURE >= 140 MM HG: ICD-10-PCS | Mod: CPTII,S$GLB,, | Performed by: NURSE PRACTITIONER

## 2022-11-07 PROCEDURE — 1157F ADVNC CARE PLAN IN RCRD: CPT | Mod: CPTII,S$GLB,, | Performed by: NURSE PRACTITIONER

## 2022-11-07 PROCEDURE — 1159F PR MEDICATION LIST DOCUMENTED IN MEDICAL RECORD: ICD-10-PCS | Mod: CPTII,S$GLB,, | Performed by: NURSE PRACTITIONER

## 2022-11-07 PROCEDURE — 3288F PR FALLS RISK ASSESSMENT DOCUMENTED: ICD-10-PCS | Mod: CPTII,S$GLB,, | Performed by: NURSE PRACTITIONER

## 2022-11-07 RX ORDER — AMLODIPINE BESYLATE 5 MG/1
5 TABLET ORAL DAILY
Qty: 90 TABLET | Refills: 3 | Status: SHIPPED | OUTPATIENT
Start: 2022-11-07 | End: 2023-03-27 | Stop reason: SDUPTHER

## 2022-11-07 RX ORDER — ZOLPIDEM TARTRATE 5 MG/1
5 TABLET ORAL NIGHTLY PRN
Qty: 90 TABLET | Refills: 0 | Status: SHIPPED | OUTPATIENT
Start: 2022-11-07 | End: 2023-03-21 | Stop reason: SDUPTHER

## 2022-11-07 NOTE — ASSESSMENT & PLAN NOTE
Blood pressure elevated. Follow up in two weeks for nurse visit. Monitor at home. Has not been taking amlodipine. Was not sure why she quit.

## 2022-11-07 NOTE — PROGRESS NOTES
Subjective:       Patient ID: Lacey Villafana is a 82 y.o. female.    Chief Complaint: Medication Refill and Hip Pain    Mrs. Villafana presents to visit for routine hypertension follow-up.  She has no acute complaints today, but does want a refill of Ambien.  Difficulty with sleep when she does not take.  Most the time only takes a half a tablet.  Blood pressure is elevated today, but patient reports that she has not been taking amlodipine due to thinking that it was discontinued previously.  Does not monitor at home.  Only associated symptom is intermittent headache.  Due for routine labs.    Medication Refill  Associated symptoms include headaches. Pertinent negatives include no abdominal pain, chest pain, chills, coughing, fatigue, fever, nausea or vomiting.   Hypertension  This is a chronic problem. The current episode started more than 1 year ago. The problem is uncontrolled. Associated symptoms include headaches. Pertinent negatives include no anxiety, blurred vision, chest pain, malaise/fatigue, palpitations, peripheral edema or shortness of breath. There are no associated agents to hypertension. Risk factors for coronary artery disease include post-menopausal state and sedentary lifestyle. Past treatments include calcium channel blockers and diuretics. There are no compliance problems.  There is no history of angina, kidney disease, CAD/MI, CVA, heart failure, left ventricular hypertrophy, PVD or retinopathy.     Patient Active Problem List   Diagnosis    Other insomnia    Essential hypertension    Prediabetes       Family History   Problem Relation Age of Onset    No Known Problems Mother     No Known Problems Father      Past Surgical History:   Procedure Laterality Date    CHOLECYSTECTOMY      HEMORRHOID SURGERY      HYSTERECTOMY      OOPHORECTOMY           Current Outpatient Medications:     aspirin (ECOTRIN) 81 MG EC tablet, Take 81 mg by mouth once daily., Disp: , Rfl:     COD LIVER OIL ORAL, Take 1  "tablet by mouth once daily., Disp: , Rfl:     FLAXSEED OIL ORAL, Take 1 tablet by mouth once daily., Disp: , Rfl:     glucosamine-chondroitin 500-400 mg tablet, Take 1 tablet by mouth 3 (three) times daily., Disp: , Rfl:     hydroCHLOROthiazide (HYDRODIURIL) 12.5 MG Tab, Take 1 tablet (12.5 mg total) by mouth once daily., Disp: 90 tablet, Rfl: 3    Lactobacillus rhamnosus GG (CULTURELLE) 10 billion cell capsule, Take 1 capsule by mouth once daily., Disp: , Rfl:     psyllium seed, with sugar, (FIBER ORAL), Take 1 capsule by mouth once daily., Disp: , Rfl:     simvastatin (ZOCOR) 40 MG tablet, TAKE 1 TABLET (40 MG TOTAL) BY MOUTH EVERY EVENING., Disp: 90 tablet, Rfl: 3    amLODIPine (NORVASC) 5 MG tablet, Take 1 tablet (5 mg total) by mouth once daily., Disp: 90 tablet, Rfl: 3    beta-carotene,A,-vits C,E/mins (OCUVITE ORAL), Take 1 tablet by mouth once daily., Disp: , Rfl:     zolpidem (AMBIEN) 5 MG Tab, Take 1 tablet (5 mg total) by mouth nightly as needed., Disp: 90 tablet, Rfl: 0    Review of Systems   Constitutional:  Negative for appetite change, chills, fatigue, fever and malaise/fatigue.   Eyes:  Negative for blurred vision and visual disturbance.   Respiratory:  Negative for cough and shortness of breath.    Cardiovascular:  Negative for chest pain, palpitations and leg swelling.   Gastrointestinal:  Negative for abdominal pain, diarrhea, nausea and vomiting.   Musculoskeletal:  Negative for gait problem.   Neurological:  Positive for headaches. Negative for dizziness and light-headedness.   Psychiatric/Behavioral:  Negative for dysphoric mood and sleep disturbance. The patient is not nervous/anxious.      Objective:   BP (!) 160/70 (BP Location: Left arm, Patient Position: Sitting, BP Method: Medium (Manual))   Pulse 79   Temp 97.9 °F (36.6 °C) (Temporal)   Ht 4' 7" (1.397 m)   Wt 51 kg (112 lb 7 oz)   SpO2 98%   BMI 26.13 kg/m²      Physical Exam  Constitutional:       General: She is not in acute " "distress.     Appearance: Normal appearance. She is not ill-appearing.      Comments: Elderly, frail     HENT:      Head: Normocephalic.   Cardiovascular:      Rate and Rhythm: Normal rate and regular rhythm.      Pulses: Normal pulses.      Heart sounds: Normal heart sounds. No murmur heard.    No friction rub. No gallop.   Pulmonary:      Effort: Pulmonary effort is normal. No respiratory distress.      Breath sounds: Normal breath sounds. No wheezing.   Musculoskeletal:      Right lower leg: No edema.      Left lower leg: No edema.   Skin:     General: Skin is warm and dry.      Coloration: Skin is not pale.      Findings: No erythema.   Neurological:      Mental Status: She is alert and oriented to person, place, and time.   Psychiatric:         Mood and Affect: Mood normal.         Behavior: Behavior normal.       Assessment & Plan     Problem List Items Addressed This Visit          Cardiac/Vascular    Essential hypertension - Primary (Chronic)    Current Assessment & Plan     Blood pressure elevated. Follow up in two weeks for nurse visit. Monitor at home. Has not been taking amlodipine. Was not sure why she quit.          Relevant Medications    amLODIPine (NORVASC) 5 MG tablet    Other Relevant Orders    COMPREHENSIVE METABOLIC PANEL    Lipid Panel       Endocrine    Prediabetes    Relevant Orders    Hemoglobin A1C    Lipid Panel       Other    Other insomnia    Current Assessment & Plan     Stable on low dose ambien.  reviewed. Continue current dose.          Relevant Medications    zolpidem (AMBIEN) 5 MG Tab       Follow up in about 2 weeks (around 11/21/2022) for hypertension nurse visit. .          Portions of this note may have been created with voice recognition software. Occasional "wrong-word" or "sound-a-like" substitutions may have occurred due to the inherent limitations of voice recognition software. Please, read the note carefully and recognize, using context, where substitutions have " occurred.

## 2022-11-08 LAB
CHOLEST SERPL-MCNC: 157 MG/DL (ref 120–199)
CHOLEST/HDLC SERPL: 2.3 {RATIO} (ref 2–5)
ESTIMATED AVG GLUCOSE: 117 MG/DL (ref 68–131)
HBA1C MFR BLD: 5.7 % (ref 4–5.6)
HDLC SERPL-MCNC: 69 MG/DL (ref 40–75)
HDLC SERPL: 43.9 % (ref 20–50)
LDLC SERPL CALC-MCNC: 76.8 MG/DL (ref 63–159)
NONHDLC SERPL-MCNC: 88 MG/DL
TRIGL SERPL-MCNC: 56 MG/DL (ref 30–150)

## 2023-02-07 DIAGNOSIS — Z00.00 ENCOUNTER FOR MEDICARE ANNUAL WELLNESS EXAM: ICD-10-CM

## 2023-02-09 DIAGNOSIS — Z00.00 ENCOUNTER FOR MEDICARE ANNUAL WELLNESS EXAM: ICD-10-CM

## 2023-03-21 DIAGNOSIS — G47.09 OTHER INSOMNIA: ICD-10-CM

## 2023-03-22 RX ORDER — ZOLPIDEM TARTRATE 5 MG/1
5 TABLET ORAL NIGHTLY PRN
Qty: 90 TABLET | Refills: 0 | Status: SHIPPED | OUTPATIENT
Start: 2023-03-22 | End: 2023-07-24

## 2023-03-27 ENCOUNTER — OFFICE VISIT (OUTPATIENT)
Dept: INTERNAL MEDICINE | Facility: CLINIC | Age: 83
End: 2023-03-27
Payer: MEDICARE

## 2023-03-27 VITALS
HEART RATE: 85 BPM | SYSTOLIC BLOOD PRESSURE: 136 MMHG | WEIGHT: 114 LBS | DIASTOLIC BLOOD PRESSURE: 52 MMHG | OXYGEN SATURATION: 95 % | HEIGHT: 55 IN | TEMPERATURE: 98 F | BODY MASS INDEX: 26.38 KG/M2

## 2023-03-27 DIAGNOSIS — G47.09 OTHER INSOMNIA: ICD-10-CM

## 2023-03-27 DIAGNOSIS — R73.03 PREDIABETES: ICD-10-CM

## 2023-03-27 DIAGNOSIS — I10 ESSENTIAL HYPERTENSION: Primary | Chronic | ICD-10-CM

## 2023-03-27 PROCEDURE — 3075F PR MOST RECENT SYSTOLIC BLOOD PRESS GE 130-139MM HG: ICD-10-PCS | Mod: HCWC,CPTII,S$GLB, | Performed by: NURSE PRACTITIONER

## 2023-03-27 PROCEDURE — 3078F PR MOST RECENT DIASTOLIC BLOOD PRESSURE < 80 MM HG: ICD-10-PCS | Mod: HCWC,CPTII,S$GLB, | Performed by: NURSE PRACTITIONER

## 2023-03-27 PROCEDURE — 1160F PR REVIEW ALL MEDS BY PRESCRIBER/CLIN PHARMACIST DOCUMENTED: ICD-10-PCS | Mod: HCWC,CPTII,S$GLB, | Performed by: NURSE PRACTITIONER

## 2023-03-27 PROCEDURE — 3078F DIAST BP <80 MM HG: CPT | Mod: HCWC,CPTII,S$GLB, | Performed by: NURSE PRACTITIONER

## 2023-03-27 PROCEDURE — 1157F PR ADVANCE CARE PLAN OR EQUIV PRESENT IN MEDICAL RECORD: ICD-10-PCS | Mod: HCWC,CPTII,S$GLB, | Performed by: NURSE PRACTITIONER

## 2023-03-27 PROCEDURE — 3075F SYST BP GE 130 - 139MM HG: CPT | Mod: HCWC,CPTII,S$GLB, | Performed by: NURSE PRACTITIONER

## 2023-03-27 PROCEDURE — 1157F ADVNC CARE PLAN IN RCRD: CPT | Mod: HCWC,CPTII,S$GLB, | Performed by: NURSE PRACTITIONER

## 2023-03-27 PROCEDURE — 1159F PR MEDICATION LIST DOCUMENTED IN MEDICAL RECORD: ICD-10-PCS | Mod: HCWC,CPTII,S$GLB, | Performed by: NURSE PRACTITIONER

## 2023-03-27 PROCEDURE — 99999 PR PBB SHADOW E&M-EST. PATIENT-LVL IV: CPT | Mod: PBBFAC,HCWC,, | Performed by: NURSE PRACTITIONER

## 2023-03-27 PROCEDURE — 99214 OFFICE O/P EST MOD 30 MIN: CPT | Mod: HCWC,S$GLB,, | Performed by: NURSE PRACTITIONER

## 2023-03-27 PROCEDURE — 1160F RVW MEDS BY RX/DR IN RCRD: CPT | Mod: HCWC,CPTII,S$GLB, | Performed by: NURSE PRACTITIONER

## 2023-03-27 PROCEDURE — 99999 PR PBB SHADOW E&M-EST. PATIENT-LVL IV: ICD-10-PCS | Mod: PBBFAC,HCWC,, | Performed by: NURSE PRACTITIONER

## 2023-03-27 PROCEDURE — 1125F PR PAIN SEVERITY QUANTIFIED, PAIN PRESENT: ICD-10-PCS | Mod: HCWC,CPTII,S$GLB, | Performed by: NURSE PRACTITIONER

## 2023-03-27 PROCEDURE — 1159F MED LIST DOCD IN RCRD: CPT | Mod: HCWC,CPTII,S$GLB, | Performed by: NURSE PRACTITIONER

## 2023-03-27 PROCEDURE — 1125F AMNT PAIN NOTED PAIN PRSNT: CPT | Mod: HCWC,CPTII,S$GLB, | Performed by: NURSE PRACTITIONER

## 2023-03-27 PROCEDURE — 99214 PR OFFICE/OUTPT VISIT, EST, LEVL IV, 30-39 MIN: ICD-10-PCS | Mod: HCWC,S$GLB,, | Performed by: NURSE PRACTITIONER

## 2023-03-27 RX ORDER — SIMVASTATIN 40 MG/1
40 TABLET, FILM COATED ORAL NIGHTLY
Qty: 90 TABLET | Refills: 3 | Status: SHIPPED | OUTPATIENT
Start: 2023-03-27 | End: 2024-01-22 | Stop reason: SDUPTHER

## 2023-03-27 RX ORDER — AMLODIPINE BESYLATE 5 MG/1
5 TABLET ORAL DAILY
Qty: 90 TABLET | Refills: 3 | Status: SHIPPED | OUTPATIENT
Start: 2023-03-27 | End: 2024-01-22 | Stop reason: SDUPTHER

## 2023-03-27 RX ORDER — HYDROCHLOROTHIAZIDE 12.5 MG/1
12.5 TABLET ORAL DAILY
Qty: 90 TABLET | Refills: 3 | Status: SHIPPED | OUTPATIENT
Start: 2023-03-27 | End: 2024-01-22 | Stop reason: SDUPTHER

## 2023-03-27 NOTE — ASSESSMENT & PLAN NOTE
Lab Results   Component Value Date    HGBA1C 5.7 (H) 11/07/2022   Stable. Continue lifestyle modifications.

## 2023-03-27 NOTE — PROGRESS NOTES
Subjective:       Patient ID: Lacey Villafana is a 82 y.o. female.    Chief Complaint: Hypertension    Mrs. Villafana presents to clinic for hypertension and medication refill. Tolerating medications well without side effects. Does not monitor BP at home. Denies CP, SOB, headache, or edema. She is without compliant today. Continues Ambien PRN insomnia.     Patient Active Problem List   Diagnosis    Other insomnia    Essential hypertension    Prediabetes       Family History   Problem Relation Age of Onset    No Known Problems Mother     No Known Problems Father      Past Surgical History:   Procedure Laterality Date    CHOLECYSTECTOMY      HEMORRHOID SURGERY      HYSTERECTOMY      OOPHORECTOMY           Current Outpatient Medications:     aspirin (ECOTRIN) 81 MG EC tablet, Take 81 mg by mouth once daily., Disp: , Rfl:     beta-carotene,A,-vits C,E/mins (OCUVITE ORAL), Take 1 tablet by mouth once daily., Disp: , Rfl:     COD LIVER OIL ORAL, Take 1 tablet by mouth once daily., Disp: , Rfl:     FLAXSEED OIL ORAL, Take 1 tablet by mouth once daily., Disp: , Rfl:     glucosamine-chondroitin 500-400 mg tablet, Take 1 tablet by mouth 3 (three) times daily., Disp: , Rfl:     Lactobacillus rhamnosus GG (CULTURELLE) 10 billion cell capsule, Take 1 capsule by mouth once daily., Disp: , Rfl:     psyllium seed, with sugar, (FIBER ORAL), Take 1 capsule by mouth once daily., Disp: , Rfl:     zolpidem (AMBIEN) 5 MG Tab, Take 1 tablet (5 mg total) by mouth nightly as needed., Disp: 90 tablet, Rfl: 0    amLODIPine (NORVASC) 5 MG tablet, Take 1 tablet (5 mg total) by mouth once daily., Disp: 90 tablet, Rfl: 3    hydroCHLOROthiazide (HYDRODIURIL) 12.5 MG Tab, Take 1 tablet (12.5 mg total) by mouth once daily., Disp: 90 tablet, Rfl: 3    simvastatin (ZOCOR) 40 MG tablet, Take 1 tablet (40 mg total) by mouth every evening., Disp: 90 tablet, Rfl: 3    Review of Systems   Constitutional:  Negative for appetite change, chills, fatigue and  "fever.   Eyes:  Negative for visual disturbance.   Respiratory:  Negative for cough and shortness of breath.    Cardiovascular:  Negative for chest pain, palpitations and leg swelling.   Gastrointestinal:  Negative for abdominal pain, diarrhea, nausea and vomiting.   Musculoskeletal:  Negative for gait problem.   Neurological:  Negative for dizziness, light-headedness and headaches.   Psychiatric/Behavioral:  Negative for dysphoric mood and sleep disturbance. The patient is not nervous/anxious.      Objective:   BP (!) 136/52 (BP Location: Left arm, Patient Position: Sitting, BP Method: Medium (Manual))   Pulse 85   Temp 97.5 °F (36.4 °C)   Ht 4' 7" (1.397 m)   Wt 51.7 kg (113 lb 15.7 oz)   SpO2 95%   BMI 26.49 kg/m²      Physical Exam  Constitutional:       General: She is not in acute distress.     Appearance: Normal appearance. She is not ill-appearing.      Comments: Elderly, frail     HENT:      Head: Normocephalic.   Cardiovascular:      Rate and Rhythm: Normal rate and regular rhythm.      Heart sounds: Normal heart sounds. No murmur heard.    No friction rub. No gallop.   Pulmonary:      Effort: Pulmonary effort is normal. No respiratory distress.      Breath sounds: Normal breath sounds. No wheezing, rhonchi or rales.   Musculoskeletal:      Right lower leg: No edema.      Left lower leg: No edema.   Skin:     General: Skin is warm and dry.      Coloration: Skin is not pale.      Findings: No erythema.   Neurological:      Mental Status: She is alert and oriented to person, place, and time.   Psychiatric:         Mood and Affect: Mood normal.         Behavior: Behavior normal.       Assessment & Plan     1. Essential hypertension  Assessment & Plan:  Stable. Continue amlodipine and HCTZ. Refills provided. RTC in 6 months or sooner if needed    Orders:  -     amLODIPine (NORVASC) 5 MG tablet; Take 1 tablet (5 mg total) by mouth once daily.  Dispense: 90 tablet; Refill: 3  -     hydroCHLOROthiazide " "(HYDRODIURIL) 12.5 MG Tab; Take 1 tablet (12.5 mg total) by mouth once daily.  Dispense: 90 tablet; Refill: 3  -     simvastatin (ZOCOR) 40 MG tablet; Take 1 tablet (40 mg total) by mouth every evening.  Dispense: 90 tablet; Refill: 3    2. Prediabetes  Assessment & Plan:  Lab Results   Component Value Date    HGBA1C 5.7 (H) 11/07/2022   Stable. Continue lifestyle modifications.        3. Other insomnia  Assessment & Plan:  Stable. Continue ambien PRN.           Niurka Simon, AGNES-VINAY      Portions of this note may have been created with voice recognition software. Occasional "wrong-word" or "sound-a-like" substitutions may have occurred due to the inherent limitations of voice recognition software. Please, read the note carefully and recognize, using context, where substitutions have occurred.     "

## 2023-06-09 ENCOUNTER — PES CALL (OUTPATIENT)
Dept: ADMINISTRATIVE | Facility: CLINIC | Age: 83
End: 2023-06-09
Payer: MEDICARE

## 2023-08-21 ENCOUNTER — TELEPHONE (OUTPATIENT)
Dept: ADMINISTRATIVE | Facility: HOSPITAL | Age: 83
End: 2023-08-21
Payer: MEDICARE

## 2023-11-17 ENCOUNTER — TELEPHONE (OUTPATIENT)
Dept: INTERNAL MEDICINE | Facility: CLINIC | Age: 83
End: 2023-11-17
Payer: MEDICARE

## 2023-11-17 NOTE — TELEPHONE ENCOUNTER
----- Message from Juliann Meyers sent at 11/16/2023  3:33 PM CST -----  Contact: Lacey Phillips   Requesting an RX refill or new RX.  Is this a refill or new RX: New   RX name and strength (copy/paste from chart):  zolpidem (AMBIEN) 5 MG Tab  Is this a 30 day or 90 day RX: 90 day   Pharmacy name and phone # (copy/paste from chart):    [facility] [place] - [address]  [address] [address] [address]  Phone: [phone] Fax: [phone]          The doctors have asked that we provide their patients with the following 2 reminders -- prescription refills can take up to 72 hours, and a friendly reminder that in the future you can use your [facility] account to request refills:Yes

## 2023-11-17 NOTE — TELEPHONE ENCOUNTER
Called patient, no answer, no vm. Per Ms. Bah patient will need an appt beings Ms. Amy is out of office.

## 2024-01-22 ENCOUNTER — LAB VISIT (OUTPATIENT)
Dept: LAB | Facility: HOSPITAL | Age: 84
End: 2024-01-22
Attending: NURSE PRACTITIONER
Payer: MEDICARE

## 2024-01-22 ENCOUNTER — OFFICE VISIT (OUTPATIENT)
Dept: INTERNAL MEDICINE | Facility: CLINIC | Age: 84
End: 2024-01-22
Payer: MEDICARE

## 2024-01-22 VITALS
HEIGHT: 55 IN | OXYGEN SATURATION: 98 % | DIASTOLIC BLOOD PRESSURE: 72 MMHG | WEIGHT: 112 LBS | HEART RATE: 86 BPM | BODY MASS INDEX: 25.92 KG/M2 | RESPIRATION RATE: 16 BRPM | TEMPERATURE: 97 F | SYSTOLIC BLOOD PRESSURE: 126 MMHG

## 2024-01-22 DIAGNOSIS — I10 ESSENTIAL HYPERTENSION: Primary | Chronic | ICD-10-CM

## 2024-01-22 DIAGNOSIS — G47.09 OTHER INSOMNIA: ICD-10-CM

## 2024-01-22 DIAGNOSIS — Z78.0 POSTMENOPAUSAL: ICD-10-CM

## 2024-01-22 DIAGNOSIS — L98.9 SKIN LESION OF RIGHT ARM: ICD-10-CM

## 2024-01-22 DIAGNOSIS — I10 ESSENTIAL HYPERTENSION: Chronic | ICD-10-CM

## 2024-01-22 DIAGNOSIS — Z23 NEED FOR VACCINATION: ICD-10-CM

## 2024-01-22 DIAGNOSIS — R73.03 PREDIABETES: ICD-10-CM

## 2024-01-22 LAB
ALBUMIN SERPL BCP-MCNC: 3.8 G/DL (ref 3.5–5.2)
ALP SERPL-CCNC: 72 U/L (ref 55–135)
ALT SERPL W/O P-5'-P-CCNC: 12 U/L (ref 10–44)
ANION GAP SERPL CALC-SCNC: 10 MMOL/L (ref 8–16)
AST SERPL-CCNC: 19 U/L (ref 10–40)
BASOPHILS # BLD AUTO: 0.07 K/UL (ref 0–0.2)
BASOPHILS NFR BLD: 1.1 % (ref 0–1.9)
BILIRUB SERPL-MCNC: 0.3 MG/DL (ref 0.1–1)
BUN SERPL-MCNC: 23 MG/DL (ref 8–23)
CALCIUM SERPL-MCNC: 9.6 MG/DL (ref 8.7–10.5)
CHLORIDE SERPL-SCNC: 105 MMOL/L (ref 95–110)
CHOLEST SERPL-MCNC: 159 MG/DL (ref 120–199)
CHOLEST/HDLC SERPL: 2.4 {RATIO} (ref 2–5)
CO2 SERPL-SCNC: 27 MMOL/L (ref 23–29)
CREAT SERPL-MCNC: 0.7 MG/DL (ref 0.5–1.4)
DIFFERENTIAL METHOD BLD: NORMAL
EOSINOPHIL # BLD AUTO: 0.2 K/UL (ref 0–0.5)
EOSINOPHIL NFR BLD: 2.4 % (ref 0–8)
ERYTHROCYTE [DISTWIDTH] IN BLOOD BY AUTOMATED COUNT: 13.6 % (ref 11.5–14.5)
EST. GFR  (NO RACE VARIABLE): >60 ML/MIN/1.73 M^2
ESTIMATED AVG GLUCOSE: 117 MG/DL (ref 68–131)
GLUCOSE SERPL-MCNC: 87 MG/DL (ref 70–110)
HBA1C MFR BLD: 5.7 % (ref 4–5.6)
HCT VFR BLD AUTO: 39.6 % (ref 37–48.5)
HDLC SERPL-MCNC: 65 MG/DL (ref 40–75)
HDLC SERPL: 40.9 % (ref 20–50)
HGB BLD-MCNC: 12.9 G/DL (ref 12–16)
IMM GRANULOCYTES # BLD AUTO: 0.02 K/UL (ref 0–0.04)
IMM GRANULOCYTES NFR BLD AUTO: 0.3 % (ref 0–0.5)
LDLC SERPL CALC-MCNC: 73 MG/DL (ref 63–159)
LYMPHOCYTES # BLD AUTO: 1.3 K/UL (ref 1–4.8)
LYMPHOCYTES NFR BLD: 20.5 % (ref 18–48)
MCH RBC QN AUTO: 29.7 PG (ref 27–31)
MCHC RBC AUTO-ENTMCNC: 32.6 G/DL (ref 32–36)
MCV RBC AUTO: 91 FL (ref 82–98)
MONOCYTES # BLD AUTO: 0.6 K/UL (ref 0.3–1)
MONOCYTES NFR BLD: 9.4 % (ref 4–15)
NEUTROPHILS # BLD AUTO: 4.1 K/UL (ref 1.8–7.7)
NEUTROPHILS NFR BLD: 66.3 % (ref 38–73)
NONHDLC SERPL-MCNC: 94 MG/DL
NRBC BLD-RTO: 0 /100 WBC
PLATELET # BLD AUTO: 241 K/UL (ref 150–450)
PMV BLD AUTO: 9.6 FL (ref 9.2–12.9)
POTASSIUM SERPL-SCNC: 3.8 MMOL/L (ref 3.5–5.1)
PROT SERPL-MCNC: 7.6 G/DL (ref 6–8.4)
RBC # BLD AUTO: 4.34 M/UL (ref 4–5.4)
SODIUM SERPL-SCNC: 142 MMOL/L (ref 136–145)
TRIGL SERPL-MCNC: 105 MG/DL (ref 30–150)
WBC # BLD AUTO: 6.14 K/UL (ref 3.9–12.7)

## 2024-01-22 PROCEDURE — 1159F MED LIST DOCD IN RCRD: CPT | Mod: HCWC,CPTII,S$GLB, | Performed by: NURSE PRACTITIONER

## 2024-01-22 PROCEDURE — 85025 COMPLETE CBC W/AUTO DIFF WBC: CPT | Mod: HCWC,PO | Performed by: NURSE PRACTITIONER

## 2024-01-22 PROCEDURE — G0008 ADMIN INFLUENZA VIRUS VAC: HCPCS | Mod: HCWC,S$GLB,, | Performed by: NURSE PRACTITIONER

## 2024-01-22 PROCEDURE — 1160F RVW MEDS BY RX/DR IN RCRD: CPT | Mod: HCWC,CPTII,S$GLB, | Performed by: NURSE PRACTITIONER

## 2024-01-22 PROCEDURE — 1126F AMNT PAIN NOTED NONE PRSNT: CPT | Mod: HCWC,CPTII,S$GLB, | Performed by: NURSE PRACTITIONER

## 2024-01-22 PROCEDURE — 3078F DIAST BP <80 MM HG: CPT | Mod: HCWC,CPTII,S$GLB, | Performed by: NURSE PRACTITIONER

## 2024-01-22 PROCEDURE — 1101F PT FALLS ASSESS-DOCD LE1/YR: CPT | Mod: HCWC,CPTII,S$GLB, | Performed by: NURSE PRACTITIONER

## 2024-01-22 PROCEDURE — 80061 LIPID PANEL: CPT | Mod: HCWC | Performed by: NURSE PRACTITIONER

## 2024-01-22 PROCEDURE — 90694 VACC AIIV4 NO PRSRV 0.5ML IM: CPT | Mod: HCWC,S$GLB,, | Performed by: NURSE PRACTITIONER

## 2024-01-22 PROCEDURE — 1157F ADVNC CARE PLAN IN RCRD: CPT | Mod: HCWC,CPTII,S$GLB, | Performed by: NURSE PRACTITIONER

## 2024-01-22 PROCEDURE — 3074F SYST BP LT 130 MM HG: CPT | Mod: HCWC,CPTII,S$GLB, | Performed by: NURSE PRACTITIONER

## 2024-01-22 PROCEDURE — 83036 HEMOGLOBIN GLYCOSYLATED A1C: CPT | Mod: HCWC | Performed by: NURSE PRACTITIONER

## 2024-01-22 PROCEDURE — 80053 COMPREHEN METABOLIC PANEL: CPT | Mod: HCWC,PO | Performed by: NURSE PRACTITIONER

## 2024-01-22 PROCEDURE — 99999 PR PBB SHADOW E&M-EST. PATIENT-LVL V: CPT | Mod: PBBFAC,HCWC,, | Performed by: NURSE PRACTITIONER

## 2024-01-22 PROCEDURE — 99214 OFFICE O/P EST MOD 30 MIN: CPT | Mod: HCWC,S$GLB,, | Performed by: NURSE PRACTITIONER

## 2024-01-22 PROCEDURE — 36415 COLL VENOUS BLD VENIPUNCTURE: CPT | Mod: HCWC,PO | Performed by: NURSE PRACTITIONER

## 2024-01-22 PROCEDURE — 3288F FALL RISK ASSESSMENT DOCD: CPT | Mod: HCWC,CPTII,S$GLB, | Performed by: NURSE PRACTITIONER

## 2024-01-22 RX ORDER — SIMVASTATIN 40 MG/1
40 TABLET, FILM COATED ORAL NIGHTLY
Qty: 90 TABLET | Refills: 3 | Status: SHIPPED | OUTPATIENT
Start: 2024-01-22

## 2024-01-22 RX ORDER — ASPIRIN 81 MG/1
81 TABLET ORAL
COMMUNITY

## 2024-01-22 RX ORDER — AMLODIPINE BESYLATE 5 MG/1
5 TABLET ORAL DAILY
Qty: 90 TABLET | Refills: 3 | Status: SHIPPED | OUTPATIENT
Start: 2024-01-22

## 2024-01-22 RX ORDER — ZOLPIDEM TARTRATE 5 MG/1
TABLET ORAL
Qty: 90 TABLET | Refills: 0 | Status: SHIPPED | OUTPATIENT
Start: 2024-01-22 | End: 2024-04-18 | Stop reason: SDUPTHER

## 2024-01-22 RX ORDER — HYDROCHLOROTHIAZIDE 12.5 MG/1
12.5 TABLET ORAL DAILY
Qty: 90 TABLET | Refills: 3 | Status: SHIPPED | OUTPATIENT
Start: 2024-01-22

## 2024-01-22 NOTE — PROGRESS NOTES
Subjective:       Patient ID: Lacey Villafana is a 83 y.o. female.    Chief Complaint: Annual Exam    Mrs. Villafana present to visit for annual wellness exam and labs. Upcoming eye exam. Due for routine labs.   Needs refill on ambien. Takes 0.5 tablet prn for sleep.   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 Has been there for over a year. No changes.     Discussed getting RSV, Shingles, covid, and tetanus from pharmacy.   Flu shot today.         Patient Active Problem List   Diagnosis    Other insomnia    Essential hypertension    Prediabetes       Family History   Problem Relation Age of Onset    No Known Problems Mother     No Known Problems Father      Past Surgical History:   Procedure Laterality Date    CHOLECYSTECTOMY      HEMORRHOID SURGERY      HYSTERECTOMY      OOPHORECTOMY           Current Outpatient Medications:     aspirin (ECOTRIN) 81 MG EC tablet, Take 81 mg by mouth once daily., Disp: , Rfl:     beta-carotene,A,-vits C,E/mins (OCUVITE ORAL), Take 1 tablet by mouth once daily., Disp: , Rfl:     COD LIVER OIL ORAL, Take 1 tablet by mouth once daily., Disp: , Rfl:     glucosamine-chondroitin 500-400 mg tablet, Take 1 tablet by mouth 3 (three) times daily., Disp: , Rfl:     psyllium seed, with sugar, (FIBER ORAL), Take 1 capsule by mouth once daily., Disp: , Rfl:     amLODIPine (NORVASC) 5 MG tablet, Take 1 tablet (5 mg total) by mouth once daily., Disp: 90 tablet, Rfl: 3    aspirin (ECOTRIN) 81 MG EC tablet, 81 mg., Disp: , Rfl:     FLAXSEED OIL ORAL, Take 1 tablet by mouth once daily., Disp: , Rfl:     hydroCHLOROthiazide (HYDRODIURIL) 12.5 MG Tab, Take 1 tablet (12.5 mg total) by mouth once daily., Disp: 90 tablet, Rfl: 3    Lactobacillus rhamnosus GG (CULTURELLE) 10 billion cell capsule, Take 1 capsule by mouth once daily., Disp: , Rfl:     simvastatin (ZOCOR) 40 MG tablet, Take 1 tablet (40 mg total) by mouth every evening., Disp: 90  "tablet, Rfl: 3    zolpidem (AMBIEN) 5 MG Tab, TAKE 1 TABLET BY MOUTH NIGHTLY AS NEEDED, Disp: 90 tablet, Rfl: 0    Review of Systems   Constitutional:  Negative for appetite change, chills, fatigue and fever.   Respiratory:  Negative for cough and shortness of breath.    Cardiovascular:  Negative for chest pain and palpitations.   Gastrointestinal:  Negative for abdominal pain, blood in stool, constipation, diarrhea, nausea and vomiting.   Genitourinary:  Negative for dysuria, frequency and urgency.   Neurological:  Negative for dizziness, light-headedness and headaches.       Objective:   /72 (BP Location: Left arm, Patient Position: Sitting, BP Method: Small (Manual))   Pulse 86   Temp 97.3 °F (36.3 °C) (Temporal)   Resp 16   Ht 4' 7" (1.397 m)   Wt 50.8 kg (111 lb 15.9 oz)   SpO2 98%   BMI 26.03 kg/m²      Physical Exam  Constitutional:       General: She is not in acute distress.     Appearance: Normal appearance. She is not ill-appearing.      Comments: Elderly, frail     HENT:      Head: Normocephalic.   Cardiovascular:      Rate and Rhythm: Normal rate and regular rhythm.      Pulses: Normal pulses.      Heart sounds: Normal heart sounds. No murmur heard.     No friction rub. No gallop.   Pulmonary:      Effort: Pulmonary effort is normal. No respiratory distress.      Breath sounds: Normal breath sounds. No wheezing.   Musculoskeletal:      Right lower leg: No edema.      Left lower leg: No edema.   Skin:     General: Skin is warm and dry.      Coloration: Skin is not pale.      Findings: No erythema.   Neurological:      Mental Status: She is alert and oriented to person, place, and time.   Psychiatric:         Mood and Affect: Mood normal.         Behavior: Behavior normal.         Assessment & Plan     Problem List Items Addressed This Visit          Cardiac/Vascular    Essential hypertension - Primary (Chronic)    Current Assessment & Plan     Blood pressure stable.  Continue current " "medications.         Relevant Medications    simvastatin (ZOCOR) 40 MG tablet    hydroCHLOROthiazide (HYDRODIURIL) 12.5 MG Tab    amLODIPine (NORVASC) 5 MG tablet    Other Relevant Orders    CBC Auto Differential    COMPREHENSIVE METABOLIC PANEL    Lipid Panel       Endocrine    Prediabetes    Current Assessment & Plan     A1c today.  Managed with lifestyle modifications.   Lab Results   Component Value Date    HGBA1C 5.7 (H) 11/07/2022            Relevant Orders    Hemoglobin A1C    Lipid Panel       Other    Other insomnia    Current Assessment & Plan     Stable. Ambien prn.          Relevant Medications    zolpidem (AMBIEN) 5 MG Tab     Other Visit Diagnoses       Postmenopausal        Relevant Orders    DXA Bone Density Axial Skeleton 1 or more sites    Skin lesion of right arm        Relevant Orders    Ambulatory referral/consult to Dermatology              Follow up in about 6 months (around 7/22/2024), or if symptoms worsen or fail to improve, for insomnia.        Portions of this note may have been created with voice recognition software. Occasional "wrong-word" or "sound-a-like" substitutions may have occurred due to the inherent limitations of voice recognition software. Please, read the note carefully and recognize, using context, where substitutions have occurred.       "

## 2024-01-22 NOTE — ASSESSMENT & PLAN NOTE
A1c today.  Managed with lifestyle modifications.   Lab Results   Component Value Date    HGBA1C 5.7 (H) 11/07/2022

## 2024-04-18 DIAGNOSIS — G47.09 OTHER INSOMNIA: ICD-10-CM

## 2024-04-18 NOTE — TELEPHONE ENCOUNTER
----- Message from Lindamatthew Ruiz sent at 4/18/2024  1:35 PM CDT -----  .Type:  RX Refill Request    Who Called: .Lacey Villafana   Refill or New Rx:refill  RX Name and Strength:zolpidem (AMBIEN) 5 MG Tab  How is the patient currently taking it? (ex. 1XDay):daily  Is this a 30 day or 90 day RX:    Preferred Pharmacy with phone number:.  [facility] [place] - [address]  [address] [address] [address]  Phone: [phone] Fax: [phone]     Local or Mail Order:local  Ordering Provider:[facility]  Would the patient rather a call back or a response via [facility]? Call back  Best Call Back Number:.[phone]   Additional Information:

## 2024-04-19 RX ORDER — ZOLPIDEM TARTRATE 5 MG/1
TABLET ORAL
Qty: 90 TABLET | Refills: 0 | Status: SHIPPED | OUTPATIENT
Start: 2024-04-19

## 2024-07-18 DIAGNOSIS — G47.09 OTHER INSOMNIA: ICD-10-CM

## 2024-07-18 RX ORDER — ZOLPIDEM TARTRATE 5 MG/1
TABLET ORAL
Qty: 90 TABLET | Refills: 0 | OUTPATIENT
Start: 2024-07-18

## 2024-07-18 NOTE — TELEPHONE ENCOUNTER
----- Message from Farzaneh Washington sent at 7/18/2024  9:03 AM CDT -----  Contact: Lacey  .Type:  RX Refill Request    Who Called:  Lacey   Refill or New Rx:Refill    RX Name and Strength: zolpidem (AMBIEN) 5 MG Tab   How is the patient currently taking it? (ex. 1XDay): As prescribed   Is this a 30 day or 90 day RX: 30   Preferred Pharmacy with phone number: .  [facility] - [place] - [address]  [address] [address] [address]  Phone: [phone] Fax: [phone]  Local or Mail Order: local   Ordering Provider: [facility]   Would the patient rather a call back or a response via [facility]?  Call   Best Call Back Number: .[phone]   Additional Information:

## 2024-08-13 ENCOUNTER — HOSPITAL ENCOUNTER (OUTPATIENT)
Dept: RADIOLOGY | Facility: HOSPITAL | Age: 84
Discharge: HOME | End: 2024-08-13
Attending: NURSE PRACTITIONER
Payer: MEDICARE

## 2024-08-13 ENCOUNTER — OFFICE VISIT (OUTPATIENT)
Dept: INTERNAL MEDICINE | Facility: CLINIC | Age: 84
End: 2024-08-13
Payer: MEDICARE

## 2024-08-13 VITALS
HEART RATE: 84 BPM | WEIGHT: 103.38 LBS | OXYGEN SATURATION: 95 % | DIASTOLIC BLOOD PRESSURE: 60 MMHG | BODY MASS INDEX: 23.92 KG/M2 | TEMPERATURE: 98 F | HEIGHT: 55 IN | SYSTOLIC BLOOD PRESSURE: 126 MMHG | RESPIRATION RATE: 16 BRPM

## 2024-08-13 DIAGNOSIS — Z78.0 POSTMENOPAUSAL: ICD-10-CM

## 2024-08-13 DIAGNOSIS — I10 ESSENTIAL HYPERTENSION: Primary | Chronic | ICD-10-CM

## 2024-08-13 DIAGNOSIS — G47.09 OTHER INSOMNIA: ICD-10-CM

## 2024-08-13 DIAGNOSIS — K43.9 VENTRAL HERNIA WITHOUT OBSTRUCTION OR GANGRENE: ICD-10-CM

## 2024-08-13 PROCEDURE — 3074F SYST BP LT 130 MM HG: CPT | Mod: HCWC,CPTII,S$GLB, | Performed by: NURSE PRACTITIONER

## 2024-08-13 PROCEDURE — 1159F MED LIST DOCD IN RCRD: CPT | Mod: HCWC,CPTII,S$GLB, | Performed by: NURSE PRACTITIONER

## 2024-08-13 PROCEDURE — 77091 TBS TECHL CALCULATION ONLY: CPT | Mod: HCWC,PO

## 2024-08-13 PROCEDURE — 99214 OFFICE O/P EST MOD 30 MIN: CPT | Mod: HCWC,S$GLB,, | Performed by: NURSE PRACTITIONER

## 2024-08-13 PROCEDURE — 1125F AMNT PAIN NOTED PAIN PRSNT: CPT | Mod: HCWC,CPTII,S$GLB, | Performed by: NURSE PRACTITIONER

## 2024-08-13 PROCEDURE — 99999 PR PBB SHADOW E&M-EST. PATIENT-LVL IV: CPT | Mod: PBBFAC,HCWC,, | Performed by: NURSE PRACTITIONER

## 2024-08-13 PROCEDURE — 77080 DXA BONE DENSITY AXIAL: CPT | Mod: TC,HCWC,PO

## 2024-08-13 PROCEDURE — 1160F RVW MEDS BY RX/DR IN RCRD: CPT | Mod: HCWC,CPTII,S$GLB, | Performed by: NURSE PRACTITIONER

## 2024-08-13 PROCEDURE — 1101F PT FALLS ASSESS-DOCD LE1/YR: CPT | Mod: HCWC,CPTII,S$GLB, | Performed by: NURSE PRACTITIONER

## 2024-08-13 PROCEDURE — 3288F FALL RISK ASSESSMENT DOCD: CPT | Mod: HCWC,CPTII,S$GLB, | Performed by: NURSE PRACTITIONER

## 2024-08-13 PROCEDURE — 3078F DIAST BP <80 MM HG: CPT | Mod: HCWC,CPTII,S$GLB, | Performed by: NURSE PRACTITIONER

## 2024-08-13 PROCEDURE — 1157F ADVNC CARE PLAN IN RCRD: CPT | Mod: HCWC,CPTII,S$GLB, | Performed by: NURSE PRACTITIONER

## 2024-08-13 RX ORDER — ZOLPIDEM TARTRATE 5 MG/1
TABLET ORAL
Qty: 90 TABLET | Refills: 1 | Status: SHIPPED | OUTPATIENT
Start: 2024-08-13

## 2024-08-13 NOTE — ASSESSMENT & PLAN NOTE
Nontender. Follow up if it gets larger or starts to have discomfort. Discussed red flag signs and when to go to ED.

## 2024-08-13 NOTE — PROGRESS NOTES
Subjective:       Patient ID: Lacey Villafana is a 84 y.o. female.    Chief Complaint: Swelling (S/s of hernia) and Medication Refill    Mrs. Villafana presents to visit for medication refill for ambien. Continues to take only 0.5 tablet HS PRN. Denies any negative side effects to medications. No acute concerns today.     Also has hernia to abdomen, above umbilicus. Onset approx 1 months. Denies any pain. Denies N/V/D, or constipation, or blood in stool.     Medication Refill  Pertinent negatives include no abdominal pain, chest pain, chills, coughing, fatigue, fever, headaches, nausea or vomiting.       Patient Active Problem List   Diagnosis    Other insomnia    Essential hypertension    Prediabetes    Ventral hernia without obstruction or gangrene       Family History   Problem Relation Name Age of Onset    No Known Problems Mother      No Known Problems Father       Past Surgical History:   Procedure Laterality Date    CHOLECYSTECTOMY      HEMORRHOID SURGERY      HYSTERECTOMY      OOPHORECTOMY           Current Outpatient Medications:     amLODIPine (NORVASC) 5 MG tablet, Take 1 tablet (5 mg total) by mouth once daily., Disp: 90 tablet, Rfl: 3    aspirin (ECOTRIN) 81 MG EC tablet, 81 mg., Disp: , Rfl:     beta-carotene,A,-vits C,E/mins (OCUVITE ORAL), Take 1 tablet by mouth once daily., Disp: , Rfl:     COD LIVER OIL ORAL, Take 1 tablet by mouth once daily., Disp: , Rfl:     glucosamine-chondroitin 500-400 mg tablet, Take 1 tablet by mouth 3 (three) times daily., Disp: , Rfl:     hydroCHLOROthiazide (HYDRODIURIL) 12.5 MG Tab, Take 1 tablet (12.5 mg total) by mouth once daily., Disp: 90 tablet, Rfl: 3    psyllium seed, with sugar, (FIBER ORAL), Take 1 capsule by mouth once daily., Disp: , Rfl:     simvastatin (ZOCOR) 40 MG tablet, Take 1 tablet (40 mg total) by mouth every evening., Disp: 90 tablet, Rfl: 3    zolpidem (AMBIEN) 5 MG Tab, TAKE 1 TABLET BY MOUTH NIGHTLY AS NEEDED, Disp: 90 tablet, Rfl: 1    Review  "of Systems   Constitutional:  Negative for appetite change, chills, fatigue and fever.   Eyes:  Negative for visual disturbance.   Respiratory:  Negative for cough and shortness of breath.    Cardiovascular:  Negative for chest pain.   Gastrointestinal:  Positive for abdominal distention. Negative for abdominal pain, blood in stool, constipation, diarrhea, nausea and vomiting.   Endocrine: Negative for polydipsia, polyphagia and polyuria.   Genitourinary:  Negative for dysuria, frequency and urgency.   Neurological:  Negative for dizziness, light-headedness and headaches.   Psychiatric/Behavioral:  Negative for dysphoric mood. The patient is not nervous/anxious.        Objective:   /60 (BP Location: Left arm, Patient Position: Sitting, BP Method: Large (Manual))   Pulse 84   Temp 98.4 °F (36.9 °C) (Oral)   Resp 16   Ht 4' 7" (1.397 m)   Wt 46.9 kg (103 lb 6.3 oz)   SpO2 95%   BMI 24.03 kg/m²      Physical Exam  Constitutional:       General: She is not in acute distress.     Appearance: Normal appearance. She is not ill-appearing.   HENT:      Head: Normocephalic.   Cardiovascular:      Rate and Rhythm: Normal rate and regular rhythm.      Pulses: Normal pulses.      Heart sounds: Normal heart sounds. No murmur heard.     No friction rub. No gallop.   Pulmonary:      Effort: Pulmonary effort is normal. No respiratory distress.      Breath sounds: Normal breath sounds. No wheezing.   Abdominal:      Palpations: Abdomen is soft.      Tenderness: There is no abdominal tenderness. There is no guarding.      Hernia: A hernia is present. Hernia is present in the ventral area.   Musculoskeletal:      Right lower leg: No edema.      Left lower leg: No edema.   Skin:     General: Skin is warm and dry.      Coloration: Skin is not pale.      Findings: No erythema.   Neurological:      Mental Status: She is alert and oriented to person, place, and time.   Psychiatric:         Mood and Affect: Mood normal.         " "Behavior: Behavior normal.         Assessment & Plan     Problem List Items Addressed This Visit          Cardiac/Vascular    Essential hypertension - Primary (Chronic)    Current Assessment & Plan     Blood pressure stable. Continue current medications.             GI    Ventral hernia without obstruction or gangrene    Current Assessment & Plan     Nontender. Follow up if it gets larger or starts to have discomfort. Discussed red flag signs and when to go to ED.             Other    Other insomnia    Current Assessment & Plan     Stable, ambien prn.         Relevant Medications    zolpidem (AMBIEN) 5 MG Tab       Follow up in about 6 months (around 2/13/2025), or if symptoms worsen or fail to improve, for hypertension/insomnia.          Portions of this note may have been created with voice recognition software. Occasional "wrong-word" or "sound-a-like" substitutions may have occurred due to the inherent limitations of voice recognition software. Please, read the note carefully and recognize, using context, where substitutions have occurred.       "

## 2024-09-03 ENCOUNTER — TELEPHONE (OUTPATIENT)
Dept: INTERNAL MEDICINE | Facility: CLINIC | Age: 84
End: 2024-09-03
Payer: MEDICARE

## 2024-09-03 NOTE — TELEPHONE ENCOUNTER
----- Message from Daisha Franco sent at 9/3/2024  2:03 PM CDT -----  Contact: Salma [phone]  Would like to receive medical advice.    Would they like a call back or a response via [facility]:  call back     Additional information:  Daughter in law is calling in regards to getting a referral for a hernia.  The hernia is getting bigger.  She is not sure what doctor the pt needs to see.

## 2024-09-17 ENCOUNTER — OFFICE VISIT (OUTPATIENT)
Dept: INTERNAL MEDICINE | Facility: CLINIC | Age: 84
End: 2024-09-17
Payer: MEDICARE

## 2024-09-17 VITALS
DIASTOLIC BLOOD PRESSURE: 84 MMHG | BODY MASS INDEX: 23.88 KG/M2 | SYSTOLIC BLOOD PRESSURE: 126 MMHG | RESPIRATION RATE: 16 BRPM | HEART RATE: 92 BPM | OXYGEN SATURATION: 95 % | TEMPERATURE: 99 F | WEIGHT: 103.19 LBS | HEIGHT: 55 IN

## 2024-09-17 DIAGNOSIS — K43.9 VENTRAL HERNIA WITHOUT OBSTRUCTION OR GANGRENE: ICD-10-CM

## 2024-09-17 DIAGNOSIS — I10 ESSENTIAL HYPERTENSION: Primary | Chronic | ICD-10-CM

## 2024-09-17 PROCEDURE — 1157F ADVNC CARE PLAN IN RCRD: CPT | Mod: HCWC,CPTII,S$GLB, | Performed by: NURSE PRACTITIONER

## 2024-09-17 PROCEDURE — 99214 OFFICE O/P EST MOD 30 MIN: CPT | Mod: HCWC,S$GLB,, | Performed by: NURSE PRACTITIONER

## 2024-09-17 PROCEDURE — 3074F SYST BP LT 130 MM HG: CPT | Mod: HCWC,CPTII,S$GLB, | Performed by: NURSE PRACTITIONER

## 2024-09-17 PROCEDURE — 3079F DIAST BP 80-89 MM HG: CPT | Mod: HCWC,CPTII,S$GLB, | Performed by: NURSE PRACTITIONER

## 2024-09-17 PROCEDURE — 1159F MED LIST DOCD IN RCRD: CPT | Mod: HCWC,CPTII,S$GLB, | Performed by: NURSE PRACTITIONER

## 2024-09-17 PROCEDURE — 1126F AMNT PAIN NOTED NONE PRSNT: CPT | Mod: HCWC,CPTII,S$GLB, | Performed by: NURSE PRACTITIONER

## 2024-09-17 PROCEDURE — 1160F RVW MEDS BY RX/DR IN RCRD: CPT | Mod: HCWC,CPTII,S$GLB, | Performed by: NURSE PRACTITIONER

## 2024-09-17 PROCEDURE — 99999 PR PBB SHADOW E&M-EST. PATIENT-LVL V: CPT | Mod: PBBFAC,HCWC,, | Performed by: NURSE PRACTITIONER

## 2024-09-17 PROCEDURE — 3288F FALL RISK ASSESSMENT DOCD: CPT | Mod: HCWC,CPTII,S$GLB, | Performed by: NURSE PRACTITIONER

## 2024-09-17 PROCEDURE — 1101F PT FALLS ASSESS-DOCD LE1/YR: CPT | Mod: HCWC,CPTII,S$GLB, | Performed by: NURSE PRACTITIONER

## 2024-09-17 NOTE — PROGRESS NOTES
Subjective:       Patient ID: Lacey Villafana is a 84 y.o. female.    Chief Complaint: Edema (Swelling to abdomen)      History of Present Illness    CHIEF COMPLAINT:  The patient presents for evaluation of an abdominal hernia that appears to be increasing in size.    HPI:  Ms. Villafana reports an abdominal hernia previously shown to the healthcare provider, which has increased in size. The hernia protrudes when she coughs and is described as very soft and non-tender to palpation. She denies any pain, discomfort, or tenderness associated with the hernia. The patient has no problems with bowel movements or urination. She takes Miralax nightly with water and reports having regular bowel movements in the morning, occasionally twice. The family member expresses concern about the visibility of the hernia.    The patient denies hematochezia, diarrhea, dyspnea, and chest pain.           Patient Active Problem List   Diagnosis    Other insomnia    Essential hypertension    Prediabetes    Ventral hernia without obstruction or gangrene         Past Surgical History:   Procedure Laterality Date    CHOLECYSTECTOMY      HEMORRHOID SURGERY      HYSTERECTOMY      OOPHORECTOMY           Current Outpatient Medications:     amLODIPine (NORVASC) 5 MG tablet, Take 1 tablet (5 mg total) by mouth once daily., Disp: 90 tablet, Rfl: 3    aspirin (ECOTRIN) 81 MG EC tablet, 81 mg., Disp: , Rfl:     beta-carotene,A,-vits C,E/mins (OCUVITE ORAL), Take 1 tablet by mouth once daily., Disp: , Rfl:     COD LIVER OIL ORAL, Take 1 tablet by mouth once daily., Disp: , Rfl:     glucosamine-chondroitin 500-400 mg tablet, Take 1 tablet by mouth 3 (three) times daily., Disp: , Rfl:     hydroCHLOROthiazide (HYDRODIURIL) 12.5 MG Tab, Take 1 tablet (12.5 mg total) by mouth once daily., Disp: 90 tablet, Rfl: 3    psyllium seed, with sugar, (FIBER ORAL), Take 1 capsule by mouth once daily., Disp: , Rfl:     simvastatin (ZOCOR) 40 MG tablet, Take 1 tablet  "(40 mg total) by mouth every evening., Disp: 90 tablet, Rfl: 3    zolpidem (AMBIEN) 5 MG Tab, TAKE 1 TABLET BY MOUTH NIGHTLY AS NEEDED, Disp: 90 tablet, Rfl: 1    Review of Systems   Constitutional:  Negative for appetite change, chills, fatigue and fever.   Respiratory:  Negative for shortness of breath.    Cardiovascular:  Negative for chest pain and palpitations.   Gastrointestinal:  Negative for abdominal pain, blood in stool, constipation, diarrhea, nausea and vomiting.        Hernia   Musculoskeletal:  Negative for back pain.   Neurological:  Negative for dizziness, light-headedness and headaches.       Objective:   /84 (BP Location: Left arm, Patient Position: Sitting, BP Method: Large (Manual))   Pulse 92   Temp 98.6 °F (37 °C) (Oral)   Resp 16   Ht 4' 7" (1.397 m)   Wt 46.8 kg (103 lb 2.8 oz)   SpO2 95%   BMI 23.98 kg/m²      Physical Exam  Constitutional:       General: She is not in acute distress.     Appearance: Normal appearance. She is not ill-appearing.   HENT:      Head: Normocephalic.   Cardiovascular:      Rate and Rhythm: Normal rate and regular rhythm.      Pulses: Normal pulses.      Heart sounds: Normal heart sounds. No murmur heard.     No friction rub. No gallop.   Pulmonary:      Effort: Pulmonary effort is normal. No respiratory distress.      Breath sounds: Normal breath sounds. No wheezing.   Abdominal:      Palpations: Abdomen is soft.      Tenderness: There is no abdominal tenderness. There is no guarding.      Hernia: A hernia is present. Hernia is present in the ventral area.   Skin:     General: Skin is warm and dry.      Coloration: Skin is not pale.      Findings: No erythema.   Neurological:      Mental Status: She is alert and oriented to person, place, and time.   Psychiatric:         Mood and Affect: Mood normal.         Behavior: Behavior normal.         Assessment & Plan     Problem List Items Addressed This Visit          Cardiac/Vascular    Essential " "hypertension - Primary (Chronic)    Current Assessment & Plan     Blood pressure stable. Continue current medications.             GI    Ventral hernia without obstruction or gangrene    Current Assessment & Plan     Nontender, soft. Discussed red flag signs and when to go to ED. Care giver opted to get evaluated by general surgery.          Relevant Orders    Ambulatory referral/consult to General Surgery       Assessment & Plan    MEDICAL DECISION MAKING:  - Assessed abdominal hernia, noting growth but soft and non-tender  - Determined surgical intervention not currently necessary due to absence of complications or discomfort  - Considered patient's age and lack of symptoms in recommending conservative management    PATIENT EDUCATION:  - Explained characteristics of concerning hernias: hardness, pain, tenderness, or inability to reduce  - Discussed potential complications of hernias    MEDICATIONS:  - Continued Miralax (or generic equivalent) nightly with water    REFERRALS:  - Referred to general surgery for hernia evaluation and to discuss treatment options      Caregiver prefers to have patient see general surgery.     Follow up if symptoms worsen or fail to improve.          Portions of this note may have been created with voice recognition software. Occasional "wrong-word" or "sound-a-like" substitutions may have occurred due to the inherent limitations of voice recognition software. Please, read the note carefully and recognize, using context, where substitutions have occurred.       This note was generated with the assistance of ambient listening technology. Verbal consent was obtained by the patient and accompanying visitor(s) for the recording of patient appointment to facilitate this note. I attest to having reviewed and edited the generated note for accuracy, though some syntax or spelling errors may persist. Please contact the author of this note for any clarification.       "

## 2024-09-17 NOTE — ASSESSMENT & PLAN NOTE
Nontender, soft. Discussed red flag signs and when to go to ED. Care giver opted to get evaluated by general surgery.

## 2024-10-07 ENCOUNTER — PATIENT MESSAGE (OUTPATIENT)
Dept: RESEARCH | Facility: HOSPITAL | Age: 84
End: 2024-10-07
Payer: MEDICARE

## 2024-10-10 ENCOUNTER — OFFICE VISIT (OUTPATIENT)
Dept: HOME HEALTH SERVICES | Facility: CLINIC | Age: 84
End: 2024-10-10
Payer: MEDICARE

## 2024-10-10 VITALS
TEMPERATURE: 98 F | DIASTOLIC BLOOD PRESSURE: 60 MMHG | OXYGEN SATURATION: 98 % | RESPIRATION RATE: 18 BRPM | HEART RATE: 75 BPM | WEIGHT: 100 LBS | SYSTOLIC BLOOD PRESSURE: 140 MMHG | HEIGHT: 56 IN | BODY MASS INDEX: 22.5 KG/M2

## 2024-10-10 DIAGNOSIS — G47.09 OTHER INSOMNIA: ICD-10-CM

## 2024-10-10 DIAGNOSIS — I10 ESSENTIAL HYPERTENSION: Primary | Chronic | ICD-10-CM

## 2024-10-10 DIAGNOSIS — K43.9 VENTRAL HERNIA WITHOUT OBSTRUCTION OR GANGRENE: ICD-10-CM

## 2024-10-10 DIAGNOSIS — Z00.00 ENCOUNTER FOR PREVENTIVE HEALTH EXAMINATION: ICD-10-CM

## 2024-10-10 DIAGNOSIS — R73.03 PREDIABETES: ICD-10-CM

## 2024-10-10 DIAGNOSIS — Z00.00 ENCOUNTER FOR MEDICARE ANNUAL WELLNESS EXAM: ICD-10-CM

## 2024-10-10 RX ORDER — CHOLECALCIFEROL (VITAMIN D3) 25 MCG
1000 TABLET ORAL DAILY
COMMUNITY

## 2024-10-10 NOTE — PATIENT INSTRUCTIONS
Counseling and Referral of Other Preventative  (Italic type indicates deductible and co-insurance are waived)    Patient Name: Lacey Villafana  Today's Date: 10/10/2024    Health Maintenance       Date Due Completion Date    TETANUS VACCINE 07/24/2009 7/24/1999    Shingles Vaccine (2 of 3) 06/14/2012 4/19/2012    RSV Vaccine (Age 60+ and Pregnant patients) (1 - 1-dose 75+ series) Never done ---    Influenza Vaccine (1) 09/01/2024 1/22/2024    COVID-19 Vaccine (6 - 2024-25 season) 09/01/2024 9/16/2022    Hemoglobin A1c (Prediabetes) 01/22/2025 1/22/2024    DEXA Scan 08/13/2027 8/13/2024    Lipid Panel 01/22/2029 1/22/2024        No orders of the defined types were placed in this encounter.    The following information is provided to all patients.  This information is to help you find resources for any of the problems found today that may be affecting your health:                  Living healthy guide: www.[facility].[place].gov      Understanding Diabetes: www.diabetes.org      Eating healthy: www.cdc.gov/healthyweight      CDC home safety checklist: www.cdc.gov/steadi/patient.html      Agency on Aging: www.goea.[place].gov      Alcoholics anonymous (AA): www.aa.org      Physical Activity: www.isabel.nih.gov/ip7gpze      Tobacco use: www.quitwithusla.org

## 2024-10-10 NOTE — PROGRESS NOTES
"Lacey Villafana presented for an initial Medicare AWV today. The following components were reviewed and updated:    Medical history  Family History  Social history  Allergies and Current Medications  Health Risk Assessment  Health Maintenance  Care Team    See Completed Assessments for Annual Wellness visit with in the encounter summary    The following assessments were completed:  Depression Screening  Cognitive function Screening  Timed Get Up Test  Whisper Test      Opioid documentation:      Patient does not have a current opioid prescription.          Vitals:    10/10/24 0943   BP: (!) 140/60   Pulse: 75   Resp: 18   Temp: 98.1 °F (36.7 °C)   SpO2: 98%   Weight: 45.4 kg (100 lb)   Height: 4' 8" (1.422 m)     Body mass index is 22.42 kg/m².       Physical Exam  Vitals reviewed.   Constitutional:       General: She is not in acute distress.     Appearance: She is not ill-appearing.   HENT:      Head: Normocephalic and atraumatic.      Nose: Nose normal.      Mouth/Throat:      Mouth: Mucous membranes are moist.      Pharynx: Oropharynx is clear.   Cardiovascular:      Rate and Rhythm: Normal rate and regular rhythm.      Heart sounds: Murmur heard.   Pulmonary:      Effort: Pulmonary effort is normal.      Breath sounds: Normal breath sounds.   Abdominal:      General: Bowel sounds are normal.      Palpations: Abdomen is soft.   Musculoskeletal:         General: Normal range of motion.      Cervical back: Neck supple.   Skin:     General: Skin is warm and dry.      Capillary Refill: Capillary refill takes less than 2 seconds.   Neurological:      Mental Status: She is alert and oriented to person, place, and time.   Psychiatric:         Mood and Affect: Mood normal.         Behavior: Behavior normal.         Thought Content: Thought content normal.         Judgment: Judgment normal.           Diagnoses and health risks identified today and associated recommendations/orders:  1. Encounter for Medicare annual " wellness exam  Completed  - Ambulatory Referral/Consult to Enhanced Annual Wellness Visit (eAWV)    2. Essential hypertension  Chronic. Systolic slightly elevated on visit 140/60.Continue current management with amlodipine 5 mg PO QD and hydrochlorothiazide 12.5 mg PO QD.  Follow up with PCP as instructed.     3. Prediabetes  Chronic and stable. Continue current management with dietary changes. Follow up with PCP as instructed.   Component      Latest Ref Rng 9/17/2020 11/15/2021 11/7/2022 1/22/2024   Hemoglobin A1C External      4.0 - 5.6 % 5.8 (H)  5.6  5.7 (H)  5.7 (H)    Estimated Avg Glucose      68 - 131 mg/dL 120  114  117  117       4. Other insomnia  Chronic and stable. Continue current management with zolpidem 5 mg PO QHS. Follow up with PCP as instructed.     5. Ventral hernia without obstruction or gangrene  Chronic and stable. Continue current management with monitoring for changes.  Follow up with PCP as instructed.     6. Encounter for preventive health examination  Completed      Provided Lacey with a 5-10 year written screening schedule and personal prevention plan. Recommendations were developed using the USPSTF age appropriate recommendations. Education, counseling, and referrals were provided as needed.  After Visit Summary printed and given to patient which includes a list of additional screenings\tests needed.    No follow-ups on file.      Shirley Zuniga NP  I offered to discuss advanced care planning, including how to pick a person who would make decisions for you if you were unable to make them for yourself, called a health care power of , and what kind of decisions you might make such as use of life sustaining treatments such as ventilators and tube feeding when faced with a life limiting illness recorded on a living will that they will need to know. (How you want to be cared for as you near the end of your natural life)     X  Patient has advanced directives on file, which we  reviewed, and they do not wish to make changes.

## 2024-11-04 ENCOUNTER — HOSPITAL ENCOUNTER (OUTPATIENT)
Facility: HOSPITAL | Age: 84
Discharge: HOME | End: 2024-11-06
Attending: EMERGENCY MEDICINE | Admitting: INTERNAL MEDICINE
Payer: MEDICARE

## 2024-11-04 DIAGNOSIS — K40.30: Primary | ICD-10-CM

## 2024-11-04 DIAGNOSIS — R10.9 ABDOMINAL PAIN: ICD-10-CM

## 2024-11-04 DIAGNOSIS — E87.6 HYPOKALEMIA: ICD-10-CM

## 2024-11-04 LAB
ALBUMIN SERPL BCP-MCNC: 3.9 G/DL (ref 3.5–5.2)
ALP SERPL-CCNC: 73 U/L (ref 40–150)
ALT SERPL W/O P-5'-P-CCNC: 13 U/L (ref 10–44)
ANION GAP SERPL CALC-SCNC: 12 MMOL/L (ref 8–16)
AST SERPL-CCNC: 19 U/L (ref 10–40)
BACTERIA #/AREA URNS AUTO: ABNORMAL /HPF
BASOPHILS # BLD AUTO: 0.06 K/UL (ref 0–0.2)
BASOPHILS NFR BLD: 0.6 % (ref 0–1.9)
BILIRUB SERPL-MCNC: 0.4 MG/DL (ref 0.1–1)
BILIRUB UR QL STRIP: NEGATIVE
BUN SERPL-MCNC: 21 MG/DL (ref 8–23)
CALCIUM SERPL-MCNC: 9 MG/DL (ref 8.7–10.5)
CHLORIDE SERPL-SCNC: 99 MMOL/L (ref 95–110)
CLARITY UR REFRACT.AUTO: ABNORMAL
CO2 SERPL-SCNC: 24 MMOL/L (ref 23–29)
COLOR UR AUTO: YELLOW
CREAT SERPL-MCNC: 0.9 MG/DL (ref 0.5–1.4)
DIFFERENTIAL METHOD BLD: ABNORMAL
EOSINOPHIL # BLD AUTO: 0 K/UL (ref 0–0.5)
EOSINOPHIL NFR BLD: 0.4 % (ref 0–8)
ERYTHROCYTE [DISTWIDTH] IN BLOOD BY AUTOMATED COUNT: 13.3 % (ref 11.5–14.5)
EST. GFR  (NO RACE VARIABLE): >60 ML/MIN/1.73 M^2
GLUCOSE SERPL-MCNC: 204 MG/DL (ref 70–110)
GLUCOSE UR QL STRIP: NEGATIVE
HCT VFR BLD AUTO: 35.2 % (ref 37–48.5)
HGB BLD-MCNC: 12 G/DL (ref 12–16)
HGB UR QL STRIP: NEGATIVE
IMM GRANULOCYTES # BLD AUTO: 0.05 K/UL (ref 0–0.04)
IMM GRANULOCYTES NFR BLD AUTO: 0.5 % (ref 0–0.5)
KETONES UR QL STRIP: NEGATIVE
LACTATE SERPL-SCNC: 2.2 MMOL/L (ref 0.5–2.2)
LEUKOCYTE ESTERASE UR QL STRIP: ABNORMAL
LYMPHOCYTES # BLD AUTO: 1 K/UL (ref 1–4.8)
LYMPHOCYTES NFR BLD: 8.8 % (ref 18–48)
MAGNESIUM SERPL-MCNC: 2.1 MG/DL (ref 1.6–2.6)
MCH RBC QN AUTO: 30 PG (ref 27–31)
MCHC RBC AUTO-ENTMCNC: 34.1 G/DL (ref 32–36)
MCV RBC AUTO: 88 FL (ref 82–98)
MICROSCOPIC COMMENT: ABNORMAL
MONOCYTES # BLD AUTO: 0.8 K/UL (ref 0.3–1)
MONOCYTES NFR BLD: 7 % (ref 4–15)
NEUTROPHILS # BLD AUTO: 8.9 K/UL (ref 1.8–7.7)
NEUTROPHILS NFR BLD: 82.7 % (ref 38–73)
NITRITE UR QL STRIP: POSITIVE
NRBC BLD-RTO: 0 /100 WBC
PH UR STRIP: 6 [PH] (ref 5–8)
PLATELET # BLD AUTO: 231 K/UL (ref 150–450)
PMV BLD AUTO: 9.7 FL (ref 9.2–12.9)
POTASSIUM SERPL-SCNC: 2.9 MMOL/L (ref 3.5–5.1)
PROT SERPL-MCNC: 7.2 G/DL (ref 6–8.4)
PROT UR QL STRIP: NEGATIVE
RBC # BLD AUTO: 4 M/UL (ref 4–5.4)
SODIUM SERPL-SCNC: 135 MMOL/L (ref 136–145)
SP GR UR STRIP: 1.02 (ref 1–1.03)
URN SPEC COLLECT METH UR: ABNORMAL
UROBILINOGEN UR STRIP-ACNC: NEGATIVE EU/DL
WBC # BLD AUTO: 10.77 K/UL (ref 3.9–12.7)
WBC #/AREA URNS AUTO: 3 /HPF (ref 0–5)

## 2024-11-04 PROCEDURE — 83735 ASSAY OF MAGNESIUM: CPT | Mod: HCWC,ER | Performed by: EMERGENCY MEDICINE

## 2024-11-04 PROCEDURE — 81000 URINALYSIS NONAUTO W/SCOPE: CPT | Mod: HCWC,ER | Performed by: EMERGENCY MEDICINE

## 2024-11-04 PROCEDURE — 80053 COMPREHEN METABOLIC PANEL: CPT | Mod: HCWC,ER | Performed by: EMERGENCY MEDICINE

## 2024-11-04 PROCEDURE — 99285 EMERGENCY DEPT VISIT HI MDM: CPT | Mod: 25,HCWC,PO,ER

## 2024-11-04 PROCEDURE — A9698 NON-RAD CONTRAST MATERIALNOC: HCPCS | Mod: HCWC,ER | Performed by: EMERGENCY MEDICINE

## 2024-11-04 PROCEDURE — G0378 HOSPITAL OBSERVATION PER HR: HCPCS | Mod: HCWC,ER

## 2024-11-04 PROCEDURE — 93010 ELECTROCARDIOGRAM REPORT: CPT | Mod: HCWC,,, | Performed by: INTERNAL MEDICINE

## 2024-11-04 PROCEDURE — 85025 COMPLETE CBC W/AUTO DIFF WBC: CPT | Mod: HCWC,ER | Performed by: EMERGENCY MEDICINE

## 2024-11-04 PROCEDURE — 25500020 PHARM REV CODE 255: Mod: HCWC,ER | Performed by: EMERGENCY MEDICINE

## 2024-11-04 PROCEDURE — 93005 ELECTROCARDIOGRAM TRACING: CPT | Mod: HCWC,ER

## 2024-11-04 PROCEDURE — 63600175 PHARM REV CODE 636 W HCPCS: Mod: HCWC,ER | Performed by: EMERGENCY MEDICINE

## 2024-11-04 PROCEDURE — 96375 TX/PRO/DX INJ NEW DRUG ADDON: CPT | Mod: HCWC,PO,ER

## 2024-11-04 PROCEDURE — 96376 TX/PRO/DX INJ SAME DRUG ADON: CPT | Mod: HCWC,PO,ER

## 2024-11-04 PROCEDURE — 25000003 PHARM REV CODE 250: Mod: HCWC,ER | Performed by: EMERGENCY MEDICINE

## 2024-11-04 PROCEDURE — 87389 HIV-1 AG W/HIV-1&-2 AB AG IA: CPT | Mod: HCWC | Performed by: EMERGENCY MEDICINE

## 2024-11-04 PROCEDURE — 96368 THER/DIAG CONCURRENT INF: CPT | Mod: HCWC,PO,ER

## 2024-11-04 PROCEDURE — 96365 THER/PROPH/DIAG IV INF INIT: CPT | Mod: 59,HCWC,PO,ER

## 2024-11-04 PROCEDURE — 83605 ASSAY OF LACTIC ACID: CPT | Mod: HCWC,ER | Performed by: EMERGENCY MEDICINE

## 2024-11-04 PROCEDURE — 86803 HEPATITIS C AB TEST: CPT | Mod: HCWC | Performed by: EMERGENCY MEDICINE

## 2024-11-04 PROCEDURE — 96366 THER/PROPH/DIAG IV INF ADDON: CPT | Mod: HCWC,PO,ER

## 2024-11-04 RX ORDER — MAGNESIUM SULFATE HEPTAHYDRATE 40 MG/ML
2 INJECTION, SOLUTION INTRAVENOUS
Status: COMPLETED | OUTPATIENT
Start: 2024-11-04 | End: 2024-11-04

## 2024-11-04 RX ORDER — HYDROMORPHONE HYDROCHLORIDE 1 MG/ML
0.5 INJECTION, SOLUTION INTRAMUSCULAR; INTRAVENOUS; SUBCUTANEOUS
Status: COMPLETED | OUTPATIENT
Start: 2024-11-04 | End: 2024-11-04

## 2024-11-04 RX ORDER — POTASSIUM CHLORIDE 7.45 MG/ML
10 INJECTION INTRAVENOUS
Status: COMPLETED | OUTPATIENT
Start: 2024-11-04 | End: 2024-11-04

## 2024-11-04 RX ORDER — ONDANSETRON HYDROCHLORIDE 2 MG/ML
4 INJECTION, SOLUTION INTRAVENOUS
Status: COMPLETED | OUTPATIENT
Start: 2024-11-04 | End: 2024-11-04

## 2024-11-04 RX ORDER — SODIUM CHLORIDE 9 MG/ML
1000 INJECTION, SOLUTION INTRAVENOUS
Status: COMPLETED | OUTPATIENT
Start: 2024-11-04 | End: 2024-11-04

## 2024-11-04 RX ADMIN — ONDANSETRON 4 MG: 2 INJECTION INTRAMUSCULAR; INTRAVENOUS at 07:11

## 2024-11-04 RX ADMIN — POTASSIUM CHLORIDE 10 MEQ: 7.46 INJECTION, SOLUTION INTRAVENOUS at 09:11

## 2024-11-04 RX ADMIN — POTASSIUM CHLORIDE 10 MEQ: 7.46 INJECTION, SOLUTION INTRAVENOUS at 08:11

## 2024-11-04 RX ADMIN — IOHEXOL 1000 ML: 12 SOLUTION ORAL at 08:11

## 2024-11-04 RX ADMIN — SODIUM CHLORIDE 1000 ML: 9 INJECTION, SOLUTION INTRAVENOUS at 08:11

## 2024-11-04 RX ADMIN — IOHEXOL 75 ML: 350 INJECTION, SOLUTION INTRAVENOUS at 08:11

## 2024-11-04 RX ADMIN — HYDROMORPHONE HYDROCHLORIDE 0.5 MG: 1 INJECTION, SOLUTION INTRAMUSCULAR; INTRAVENOUS; SUBCUTANEOUS at 08:11

## 2024-11-04 RX ADMIN — HYDROMORPHONE HYDROCHLORIDE 0.5 MG: 1 INJECTION, SOLUTION INTRAMUSCULAR; INTRAVENOUS; SUBCUTANEOUS at 07:11

## 2024-11-04 RX ADMIN — MAGNESIUM SULFATE HEPTAHYDRATE 2 G: 40 INJECTION, SOLUTION INTRAVENOUS at 08:11

## 2024-11-05 ENCOUNTER — NURSE TRIAGE (OUTPATIENT)
Dept: ADMINISTRATIVE | Facility: CLINIC | Age: 84
End: 2024-11-05
Payer: MEDICARE

## 2024-11-05 ENCOUNTER — ANESTHESIA EVENT (OUTPATIENT)
Dept: SURGERY | Facility: HOSPITAL | Age: 84
End: 2024-11-05
Payer: MEDICARE

## 2024-11-05 ENCOUNTER — ANESTHESIA (OUTPATIENT)
Dept: SURGERY | Facility: HOSPITAL | Age: 84
End: 2024-11-05
Payer: MEDICARE

## 2024-11-05 PROBLEM — E87.6 HYPOKALEMIA: Status: ACTIVE | Noted: 2024-11-05

## 2024-11-05 PROBLEM — K40.90 INGUINAL HERNIA: Status: ACTIVE | Noted: 2024-08-13

## 2024-11-05 PROBLEM — K43.9 VENTRAL HERNIA WITHOUT OBSTRUCTION OR GANGRENE: Status: ACTIVE | Noted: 2024-11-05

## 2024-11-05 PROBLEM — N39.0 UTI (URINARY TRACT INFECTION): Status: ACTIVE | Noted: 2024-11-05

## 2024-11-05 LAB
ANION GAP SERPL CALC-SCNC: 12 MMOL/L (ref 8–16)
BUN SERPL-MCNC: 12 MG/DL (ref 8–23)
CALCIUM SERPL-MCNC: 9 MG/DL (ref 8.7–10.5)
CHLORIDE SERPL-SCNC: 102 MMOL/L (ref 95–110)
CO2 SERPL-SCNC: 23 MMOL/L (ref 23–29)
CREAT SERPL-MCNC: 0.7 MG/DL (ref 0.5–1.4)
EST. GFR  (NO RACE VARIABLE): >60 ML/MIN/1.73 M^2
GLUCOSE SERPL-MCNC: 108 MG/DL (ref 70–110)
HCV AB SERPL QL IA: NEGATIVE
HEP C VIRUS HOLD SPECIMEN: NORMAL
HIV 1+2 AB+HIV1 P24 AG SERPL QL IA: NEGATIVE
OHS QRS DURATION: 80 MS
OHS QTC CALCULATION: 424 MS
POTASSIUM SERPL-SCNC: 3.5 MMOL/L (ref 3.5–5.1)
SODIUM SERPL-SCNC: 137 MMOL/L (ref 136–145)

## 2024-11-05 PROCEDURE — 37000008 HC ANESTHESIA 1ST 15 MINUTES: Mod: HCWC | Performed by: SURGERY

## 2024-11-05 PROCEDURE — 96374 THER/PROPH/DIAG INJ IV PUSH: CPT | Mod: 59

## 2024-11-05 PROCEDURE — 49505 PRP I/HERN INIT REDUC >5 YR: CPT | Mod: HCWC,LT,, | Performed by: SURGERY

## 2024-11-05 PROCEDURE — 96361 HYDRATE IV INFUSION ADD-ON: CPT | Mod: HCWC,PO,ER

## 2024-11-05 PROCEDURE — 71000033 HC RECOVERY, INTIAL HOUR: Mod: HCWC | Performed by: SURGERY

## 2024-11-05 PROCEDURE — G0378 HOSPITAL OBSERVATION PER HR: HCPCS | Mod: HCWC

## 2024-11-05 PROCEDURE — 80048 BASIC METABOLIC PNL TOTAL CA: CPT | Mod: HCWC,ER | Performed by: INTERNAL MEDICINE

## 2024-11-05 PROCEDURE — 25000003 PHARM REV CODE 250: Mod: HCWC | Performed by: NURSE PRACTITIONER

## 2024-11-05 PROCEDURE — 36000707: Mod: HCWC | Performed by: SURGERY

## 2024-11-05 PROCEDURE — 37000009 HC ANESTHESIA EA ADD 15 MINS: Mod: HCWC | Performed by: SURGERY

## 2024-11-05 PROCEDURE — 25000003 PHARM REV CODE 250: Mod: HCWC | Performed by: FAMILY MEDICINE

## 2024-11-05 PROCEDURE — C1781 MESH (IMPLANTABLE): HCPCS | Mod: HCWC | Performed by: SURGERY

## 2024-11-05 PROCEDURE — 63600175 PHARM REV CODE 636 W HCPCS: Mod: HCWC | Performed by: FAMILY MEDICINE

## 2024-11-05 PROCEDURE — 63600175 PHARM REV CODE 636 W HCPCS: Mod: HCWC,ER | Performed by: EMERGENCY MEDICINE

## 2024-11-05 PROCEDURE — 25000003 PHARM REV CODE 250: Mod: HCWC | Performed by: SURGERY

## 2024-11-05 PROCEDURE — G0378 HOSPITAL OBSERVATION PER HR: HCPCS | Mod: HCWC,PO,ER

## 2024-11-05 PROCEDURE — 63600175 PHARM REV CODE 636 W HCPCS: Mod: HCWC | Performed by: SURGERY

## 2024-11-05 PROCEDURE — 49591 RPR AA HRN 1ST < 3 CM RDC: CPT | Mod: 51,HCWC,, | Performed by: SURGERY

## 2024-11-05 PROCEDURE — 36000706: Mod: HCWC | Performed by: SURGERY

## 2024-11-05 PROCEDURE — 63600175 PHARM REV CODE 636 W HCPCS: Mod: HCWC | Performed by: NURSE ANESTHETIST, CERTIFIED REGISTERED

## 2024-11-05 PROCEDURE — 99223 1ST HOSP IP/OBS HIGH 75: CPT | Mod: 57,25,HCWC, | Performed by: SURGERY

## 2024-11-05 PROCEDURE — 63600175 PHARM REV CODE 636 W HCPCS: Mod: HCWC | Performed by: NURSE PRACTITIONER

## 2024-11-05 PROCEDURE — 94761 N-INVAS EAR/PLS OXIMETRY MLT: CPT | Mod: HCWC

## 2024-11-05 PROCEDURE — 25000003 PHARM REV CODE 250: Mod: HCWC | Performed by: NURSE ANESTHETIST, CERTIFIED REGISTERED

## 2024-11-05 PROCEDURE — 99900035 HC TECH TIME PER 15 MIN (STAT): Mod: HCWC

## 2024-11-05 DEVICE — VENTRALEX ST HERNIA PATCH, 6.4 CM (2.5"), CIRCLE
Type: IMPLANTABLE DEVICE | Site: ABDOMEN | Status: FUNCTIONAL
Brand: VENTRALEX

## 2024-11-05 DEVICE — POLYPROPYLENE PARTIALLY ABSORBABLE SMALL BLUE / UNDYED PLUG AND ONLAY PATCH
Type: IMPLANTABLE DEVICE | Site: ABDOMEN | Status: FUNCTIONAL
Brand: ULTRAPRO

## 2024-11-05 RX ORDER — SODIUM CHLORIDE, SODIUM LACTATE, POTASSIUM CHLORIDE, CALCIUM CHLORIDE 600; 310; 30; 20 MG/100ML; MG/100ML; MG/100ML; MG/100ML
INJECTION, SOLUTION INTRAVENOUS CONTINUOUS
Status: DISCONTINUED | OUTPATIENT
Start: 2024-11-05 | End: 2024-11-06

## 2024-11-05 RX ORDER — ONDANSETRON HYDROCHLORIDE 2 MG/ML
4 INJECTION, SOLUTION INTRAVENOUS EVERY 8 HOURS PRN
Status: DISCONTINUED | OUTPATIENT
Start: 2024-11-05 | End: 2024-11-06 | Stop reason: HOSPADM

## 2024-11-05 RX ORDER — CEFAZOLIN 2 G/1
2 INJECTION, POWDER, FOR SOLUTION INTRAMUSCULAR; INTRAVENOUS ONCE
Status: DISCONTINUED | OUTPATIENT
Start: 2024-11-05 | End: 2024-11-05

## 2024-11-05 RX ORDER — ROCURONIUM BROMIDE 10 MG/ML
INJECTION, SOLUTION INTRAVENOUS
Status: DISCONTINUED | OUTPATIENT
Start: 2024-11-05 | End: 2024-11-05

## 2024-11-05 RX ORDER — CHLORHEXIDINE GLUCONATE ORAL RINSE 1.2 MG/ML
10 SOLUTION DENTAL 2 TIMES DAILY
Status: DISCONTINUED | OUTPATIENT
Start: 2024-11-05 | End: 2024-11-06 | Stop reason: HOSPADM

## 2024-11-05 RX ORDER — HYDROCODONE BITARTRATE AND ACETAMINOPHEN 7.5; 325 MG/1; MG/1
1 TABLET ORAL EVERY 6 HOURS PRN
Status: DISCONTINUED | OUTPATIENT
Start: 2024-11-05 | End: 2024-11-06 | Stop reason: HOSPADM

## 2024-11-05 RX ORDER — ENOXAPARIN SODIUM 100 MG/ML
40 INJECTION SUBCUTANEOUS EVERY 24 HOURS
Status: DISCONTINUED | OUTPATIENT
Start: 2024-11-05 | End: 2024-11-06 | Stop reason: HOSPADM

## 2024-11-05 RX ORDER — DEXAMETHASONE SODIUM PHOSPHATE 4 MG/ML
INJECTION, SOLUTION INTRA-ARTICULAR; INTRALESIONAL; INTRAMUSCULAR; INTRAVENOUS; SOFT TISSUE
Status: DISCONTINUED | OUTPATIENT
Start: 2024-11-05 | End: 2024-11-05

## 2024-11-05 RX ORDER — ONDANSETRON HYDROCHLORIDE 2 MG/ML
4 INJECTION, SOLUTION INTRAVENOUS DAILY PRN
Status: DISCONTINUED | OUTPATIENT
Start: 2024-11-05 | End: 2024-11-05 | Stop reason: HOSPADM

## 2024-11-05 RX ORDER — ACETAMINOPHEN 10 MG/ML
INJECTION, SOLUTION INTRAVENOUS
Status: DISCONTINUED | OUTPATIENT
Start: 2024-11-05 | End: 2024-11-05

## 2024-11-05 RX ORDER — PROPOFOL 10 MG/ML
VIAL (ML) INTRAVENOUS
Status: DISCONTINUED | OUTPATIENT
Start: 2024-11-05 | End: 2024-11-05

## 2024-11-05 RX ORDER — HYDROCHLOROTHIAZIDE 12.5 MG/1
12.5 TABLET ORAL DAILY
Status: DISCONTINUED | OUTPATIENT
Start: 2024-11-05 | End: 2024-11-06 | Stop reason: HOSPADM

## 2024-11-05 RX ORDER — HYDROMORPHONE HYDROCHLORIDE 1 MG/ML
0.2 INJECTION, SOLUTION INTRAMUSCULAR; INTRAVENOUS; SUBCUTANEOUS EVERY 5 MIN PRN
Status: DISCONTINUED | OUTPATIENT
Start: 2024-11-05 | End: 2024-11-05 | Stop reason: HOSPADM

## 2024-11-05 RX ORDER — OXYCODONE AND ACETAMINOPHEN 5; 325 MG/1; MG/1
1 TABLET ORAL
Status: DISCONTINUED | OUTPATIENT
Start: 2024-11-05 | End: 2024-11-05 | Stop reason: HOSPADM

## 2024-11-05 RX ORDER — HYDROMORPHONE HYDROCHLORIDE 1 MG/ML
0.5 INJECTION, SOLUTION INTRAMUSCULAR; INTRAVENOUS; SUBCUTANEOUS EVERY 4 HOURS PRN
Status: DISCONTINUED | OUTPATIENT
Start: 2024-11-05 | End: 2024-11-06 | Stop reason: HOSPADM

## 2024-11-05 RX ORDER — ONDANSETRON HYDROCHLORIDE 2 MG/ML
4 INJECTION, SOLUTION INTRAVENOUS ONCE AS NEEDED
Status: DISCONTINUED | OUTPATIENT
Start: 2024-11-05 | End: 2024-11-05 | Stop reason: HOSPADM

## 2024-11-05 RX ORDER — ACETAMINOPHEN 325 MG/1
650 TABLET ORAL EVERY 8 HOURS PRN
Status: DISCONTINUED | OUTPATIENT
Start: 2024-11-05 | End: 2024-11-06 | Stop reason: HOSPADM

## 2024-11-05 RX ORDER — ONDANSETRON HYDROCHLORIDE 2 MG/ML
INJECTION, SOLUTION INTRAVENOUS
Status: DISCONTINUED | OUTPATIENT
Start: 2024-11-05 | End: 2024-11-05

## 2024-11-05 RX ORDER — MEPERIDINE HYDROCHLORIDE 25 MG/ML
12.5 INJECTION INTRAMUSCULAR; INTRAVENOUS; SUBCUTANEOUS ONCE AS NEEDED
Status: DISCONTINUED | OUTPATIENT
Start: 2024-11-05 | End: 2024-11-05 | Stop reason: HOSPADM

## 2024-11-05 RX ORDER — SODIUM CHLORIDE 0.9 % (FLUSH) 0.9 %
10 SYRINGE (ML) INJECTION
Status: DISCONTINUED | OUTPATIENT
Start: 2024-11-05 | End: 2024-11-06 | Stop reason: HOSPADM

## 2024-11-05 RX ORDER — HYDROCODONE BITARTRATE AND ACETAMINOPHEN 5; 325 MG/1; MG/1
1 TABLET ORAL EVERY 6 HOURS PRN
Status: DISCONTINUED | OUTPATIENT
Start: 2024-11-05 | End: 2024-11-06 | Stop reason: HOSPADM

## 2024-11-05 RX ORDER — ONDANSETRON 8 MG/1
8 TABLET, ORALLY DISINTEGRATING ORAL EVERY 8 HOURS PRN
Status: DISCONTINUED | OUTPATIENT
Start: 2024-11-05 | End: 2024-11-06 | Stop reason: HOSPADM

## 2024-11-05 RX ORDER — ATORVASTATIN CALCIUM 10 MG/1
20 TABLET, FILM COATED ORAL NIGHTLY
Status: DISCONTINUED | OUTPATIENT
Start: 2024-11-05 | End: 2024-11-06 | Stop reason: HOSPADM

## 2024-11-05 RX ORDER — CEFAZOLIN SODIUM 1 G/3ML
INJECTION, POWDER, FOR SOLUTION INTRAMUSCULAR; INTRAVENOUS
Status: DISCONTINUED | OUTPATIENT
Start: 2024-11-05 | End: 2024-11-05

## 2024-11-05 RX ORDER — FENTANYL CITRATE 50 UG/ML
25 INJECTION, SOLUTION INTRAMUSCULAR; INTRAVENOUS EVERY 5 MIN PRN
Status: DISCONTINUED | OUTPATIENT
Start: 2024-11-05 | End: 2024-11-05 | Stop reason: HOSPADM

## 2024-11-05 RX ORDER — BUPIVACAINE HYDROCHLORIDE 2.5 MG/ML
INJECTION, SOLUTION EPIDURAL; INFILTRATION; INTRACAUDAL
Status: DISCONTINUED | OUTPATIENT
Start: 2024-11-05 | End: 2024-11-05 | Stop reason: HOSPADM

## 2024-11-05 RX ORDER — AMLODIPINE BESYLATE 5 MG/1
5 TABLET ORAL DAILY
Status: DISCONTINUED | OUTPATIENT
Start: 2024-11-05 | End: 2024-11-06 | Stop reason: HOSPADM

## 2024-11-05 RX ORDER — SUCCINYLCHOLINE CHLORIDE 20 MG/ML
INJECTION INTRAMUSCULAR; INTRAVENOUS
Status: DISCONTINUED | OUTPATIENT
Start: 2024-11-05 | End: 2024-11-05

## 2024-11-05 RX ORDER — FENTANYL CITRATE 50 UG/ML
INJECTION, SOLUTION INTRAMUSCULAR; INTRAVENOUS
Status: DISCONTINUED | OUTPATIENT
Start: 2024-11-05 | End: 2024-11-05

## 2024-11-05 RX ORDER — CEFTRIAXONE 1 G/1
1 INJECTION, POWDER, FOR SOLUTION INTRAMUSCULAR; INTRAVENOUS
Status: DISCONTINUED | OUTPATIENT
Start: 2024-11-05 | End: 2024-11-06 | Stop reason: HOSPADM

## 2024-11-05 RX ORDER — ZOLPIDEM TARTRATE 5 MG/1
5 TABLET ORAL NIGHTLY PRN
Status: DISCONTINUED | OUTPATIENT
Start: 2024-11-05 | End: 2024-11-06 | Stop reason: HOSPADM

## 2024-11-05 RX ADMIN — SUCCINYLCHOLINE CHLORIDE 100 MG: 20 INJECTION, SOLUTION INTRAMUSCULAR; INTRAVENOUS; PARENTERAL at 12:11

## 2024-11-05 RX ADMIN — HYDROCHLOROTHIAZIDE 12.5 MG: 12.5 TABLET ORAL at 09:11

## 2024-11-05 RX ADMIN — CEFTRIAXONE 1 G: 1 INJECTION, POWDER, FOR SOLUTION INTRAMUSCULAR; INTRAVENOUS at 09:11

## 2024-11-05 RX ADMIN — ATORVASTATIN CALCIUM 20 MG: 10 TABLET, FILM COATED ORAL at 08:11

## 2024-11-05 RX ADMIN — SUGAMMADEX 200 MG: 100 INJECTION, SOLUTION INTRAVENOUS at 01:11

## 2024-11-05 RX ADMIN — CEFAZOLIN 2 G: 330 INJECTION, POWDER, FOR SOLUTION INTRAMUSCULAR; INTRAVENOUS at 12:11

## 2024-11-05 RX ADMIN — ACETAMINOPHEN 1000 MG: 10 INJECTION, SOLUTION INTRAVENOUS at 12:11

## 2024-11-05 RX ADMIN — FENTANYL CITRATE 50 MCG: 50 INJECTION, SOLUTION INTRAMUSCULAR; INTRAVENOUS at 12:11

## 2024-11-05 RX ADMIN — DEXAMETHASONE SODIUM PHOSPHATE 4 MG: 4 INJECTION, SOLUTION INTRA-ARTICULAR; INTRALESIONAL; INTRAMUSCULAR; INTRAVENOUS; SOFT TISSUE at 01:11

## 2024-11-05 RX ADMIN — SODIUM CHLORIDE, SODIUM LACTATE, POTASSIUM CHLORIDE, AND CALCIUM CHLORIDE: .6; .31; .03; .02 INJECTION, SOLUTION INTRAVENOUS at 12:11

## 2024-11-05 RX ADMIN — SODIUM CHLORIDE, POTASSIUM CHLORIDE, SODIUM LACTATE AND CALCIUM CHLORIDE 1000 ML: 600; 310; 30; 20 INJECTION, SOLUTION INTRAVENOUS at 12:11

## 2024-11-05 RX ADMIN — ROCURONIUM BROMIDE 30 MG: 10 SOLUTION INTRAVENOUS at 12:11

## 2024-11-05 RX ADMIN — CHLORHEXIDINE GLUCONATE 0.12% ORAL RINSE 10 ML: 1.2 LIQUID ORAL at 08:11

## 2024-11-05 RX ADMIN — ZOLPIDEM TARTRATE 5 MG: 5 TABLET, FILM COATED ORAL at 08:11

## 2024-11-05 RX ADMIN — SODIUM CHLORIDE, POTASSIUM CHLORIDE, SODIUM LACTATE AND CALCIUM CHLORIDE: 600; 310; 30; 20 INJECTION, SOLUTION INTRAVENOUS at 03:11

## 2024-11-05 RX ADMIN — AMLODIPINE BESYLATE 5 MG: 5 TABLET ORAL at 09:11

## 2024-11-05 RX ADMIN — ONDANSETRON 4 MG: 2 INJECTION INTRAMUSCULAR; INTRAVENOUS at 01:11

## 2024-11-05 RX ADMIN — PROPOFOL 70 MG: 10 INJECTION, EMULSION INTRAVENOUS at 12:11

## 2024-11-05 RX ADMIN — ENOXAPARIN SODIUM 40 MG: 40 INJECTION SUBCUTANEOUS at 04:11

## 2024-11-05 NOTE — ASSESSMENT & PLAN NOTE
She was reducible supraumbilical fit from open repair with benefits and complications were discussed including infection,, seroma, tolerance of the hernia, and bowel injury    The rationale for using mesh was reviewed with the patient and her family

## 2024-11-05 NOTE — ANESTHESIA POSTPROCEDURE EVALUATION
Anesthesia Post Evaluation    Patient: Lacey Villafana    Procedure(s) Performed: Procedure(s) (LRB):  REPAIR, HERNIA, INGUINAL (Left)  REPAIR, HERNIA, VENTRAL (N/A)    Final Anesthesia Type: general      Patient location during evaluation: PACU  Patient participation: Yes- Able to Participate  Level of consciousness: awake and alert, oriented and awake  Post-procedure vital signs: reviewed and stable  Pain management: adequate  Airway patency: patent    PONV status at discharge: No PONV  Anesthetic complications: no      Cardiovascular status: blood pressure returned to baseline  Respiratory status: unassisted  Hydration status: euvolemic  Follow-up not needed.              Vitals Value Taken Time   /76 11/05/24 1530   Temp 36.6 °C (97.8 °F) 11/05/24 1530   Pulse 77 11/05/24 1530   Resp 14 11/05/24 1530   SpO2 96 % 11/05/24 1530         Event Time   Out of Recovery 11/05/2024 14:40:00         Pain/Yuni Score: Pain Rating Prior to Med Admin: 10 (11/4/2024  8:54 PM)  Pain Rating Post Med Admin: 5 (11/4/2024  9:23 PM)  Yuni Score: 9 (11/5/2024  2:33 PM)

## 2024-11-05 NOTE — NURSING TRANSFER
Nursing Transfer Note      11/5/2024   2:53 PM    Nurse giving handoff:giovanny  Nurse receiving handoff:yanely    Reason patient is being transferred: post-op    Transfer From: pacu    Transfer via bed    Transfer with cardiac monitoring    Transported by RN    Telemetry: Box Number [id number]  Order for Tele Monitor? Yes        Patient belongings transferred with patient:  NA    Chart send with patient: Yes    Notified: family at bedside    Patient reassessed at: 11/05/2024, 1453   (date, time)    Upon arrival to floor: cardiac monitor applied, patient oriented to room, call bell in reach, and bed in lowest position

## 2024-11-05 NOTE — SUBJECTIVE & OBJECTIVE
No current facility-administered medications on file prior to encounter.     Current Outpatient Medications on File Prior to Encounter   Medication Sig    amLODIPine (NORVASC) 5 MG tablet Take 1 tablet (5 mg total) by mouth once daily.    aspirin (ECOTRIN) 81 MG EC tablet 81 mg.    hydroCHLOROthiazide (HYDRODIURIL) 12.5 MG Tab Take 1 tablet (12.5 mg total) by mouth once daily.    simvastatin (ZOCOR) 40 MG tablet Take 1 tablet (40 mg total) by mouth every evening.    zolpidem (AMBIEN) 5 MG Tab TAKE 1 TABLET BY MOUTH NIGHTLY AS NEEDED    beta-carotene,A,-vits C,E/mins (OCUVITE ORAL) Take 1 tablet by mouth once daily.    COD LIVER OIL ORAL Take 1 tablet by mouth once daily.    glucosamine-chondroitin 500-400 mg tablet Take 1 tablet by mouth 3 (three) times daily.    vitamin D (VITAMIN D3) 1000 units Tab Take 1,000 Units by mouth once daily.       Review of patient's allergies indicates:   Allergen Reactions    Vioxx [rofecoxib]        Past Medical History:   Diagnosis Date    Hypertension      Past Surgical History:   Procedure Laterality Date    CHOLECYSTECTOMY      HEMORRHOID SURGERY      HYSTERECTOMY      OOPHORECTOMY       Family History       Problem Relation (Age of Onset)    No Known Problems Mother, Father          Tobacco Use    Smoking status: Never    Smokeless tobacco: Never   Substance and Sexual Activity    Alcohol use: No    Drug use: No    Sexual activity: Not Currently     Partners: Male     Review of Systems   Constitutional:  Negative for appetite change, chills, fatigue, fever and unexpected weight change.   HENT:  Negative for hearing loss and rhinorrhea.    Eyes:  Negative for visual disturbance.   Respiratory:  Negative for apnea, cough, shortness of breath and wheezing.    Cardiovascular:  Negative for chest pain and palpitations.   Gastrointestinal:  Positive for abdominal pain. Negative for abdominal distention, blood in stool, constipation, diarrhea, nausea and vomiting.        Left groin  bulge    Supraumbilical abdominal wall above   Genitourinary:  Negative for dysuria, frequency and urgency.   Musculoskeletal:  Negative for arthralgias and neck pain.   Skin:  Negative for rash.   Neurological:  Negative for seizures, weakness, numbness and headaches.   Hematological:  Negative for adenopathy. Does not bruise/bleed easily.   Psychiatric/Behavioral:  Negative for hallucinations. The patient is not nervous/anxious.      Objective:     Vital Signs (Most Recent):  Temp: 97.6 °F (36.4 °C) (11/05/24 0730)  Pulse: 66 (11/05/24 0920)  Resp: 12 (11/05/24 0730)  BP: (!) 167/74 (11/05/24 0730)  SpO2: 98 % (11/05/24 0730) Vital Signs (24h Range):  Temp:  [97.6 °F (36.4 °C)-97.9 °F (36.6 °C)] 97.6 °F (36.4 °C)  Pulse:  [66-93] 66  Resp:  [12-20] 12  SpO2:  [95 %-99 %] 98 %  BP: (134-169)/(63-74) 167/74     Weight: 44.5 kg (98 lb 1.7 oz)  Body mass index is 21.99 kg/m².     Physical Exam  Vitals reviewed.   Constitutional:       Appearance: She is well-developed.      Comments: Slightly frail   HENT:      Head: Normocephalic.   Eyes:      Pupils: Pupils are equal, round, and reactive to light.   Neck:      Thyroid: No thyromegaly.      Vascular: No JVD.      Trachea: No tracheal deviation.   Cardiovascular:      Rate and Rhythm: Normal rate and regular rhythm.      Heart sounds: Normal heart sounds.   Pulmonary:      Breath sounds: Normal breath sounds. No wheezing.   Abdominal:      General: Bowel sounds are normal. There is no distension.      Palpations: Abdomen is soft. Abdomen is not rigid. There is no mass.      Tenderness: There is no abdominal tenderness. There is no guarding or rebound.      Hernia: A hernia (Inguinal hernia, reducible supraumbilical hernia) is present.   Musculoskeletal:         General: Normal range of motion.   Lymphadenopathy:      Cervical: No cervical adenopathy.   Skin:     General: Skin is warm and dry.      Findings: No erythema or rash.   Neurological:      Mental Status: She  is oriented to person, place, and time.            I have reviewed all pertinent lab results within the past 24 hours.  CBC:   Recent Labs   Lab 11/04/24 1849   WBC 10.77   RBC 4.00   HGB 12.0   HCT 35.2*      MCV 88   MCH 30.0   MCHC 34.1     BMP:   Recent Labs   Lab 11/04/24 1849 11/05/24  0246   * 108   * 137   K 2.9* 3.5   CL 99 102   CO2 24 23   BUN 21 12   CREATININE 0.9 0.7   CALCIUM 9.0 9.0   MG 2.1  --      CMP:   Recent Labs   Lab 11/04/24 1849 11/05/24  0246   * 108   CALCIUM 9.0 9.0   ALBUMIN 3.9  --    PROT 7.2  --    * 137   K 2.9* 3.5   CO2 24 23   CL 99 102   BUN 21 12   CREATININE 0.9 0.7   ALKPHOS 73  --    ALT 13  --    AST 19  --    BILITOT 0.4  --      LFTs:   Recent Labs   Lab 11/04/24 1849   ALT 13   AST 19   ALKPHOS 73   BILITOT 0.4   PROT 7.2   ALBUMIN 3.9     Urinalysis was reviewed.  Urinalysis micro was reviewed.  There are no significant numbers of white blood cells or bacteria     Significant Diagnostics:  I have reviewed all pertinent imaging results/findings within the past 24 hours.  CT: I have reviewed all pertinent results/findings within the past 24 hours and my personal findings are:  Incarcerated left inguinal hernia    Reading Physician Reading Date Result Priority   Donny Palmer MD  [phone] 11/4/2024 STAT     Narrative & Impression  EXAMINATION:  CT ABDOMEN PELVIS WITH IV CONTRAST     CLINICAL HISTORY:  Bowel obstruction suspected;     TECHNIQUE:  Low dose axial images, sagittal and coronal reformations were obtained from the lung bases to the pubic symphysis following the IV administration of 100 mL of Omnipaque 350.     COMPARISON:  None     FINDINGS:  Heart: Normal size as far as seen. No effusion as far as seen.     Lung Bases: Clear.     Liver: Mild intrahepatic biliary dilatation.  No focal lesions.  Fatty infiltration of the liver.     Gallbladder: Status post cholecystectomy.     Bile Ducts: No dilatation.     Pancreas: No  mass. No peripancreatic fat stranding.     Spleen: Normal.     Adrenals: Normal.     Kidneys/Ureters: Normal enhancement.  No mass or  hydroureteronephrosis.     Bladder: No wall thickening.     Reproductive organs: Status post hysterectomy.     GI Tract/Mesentery: Dilated small bowel loops in the mid abdomen measuring up to 3.2 cm consistent with partial small bowel obstruction.  Lead point in the bowel containing left inguinal hernia is noted.  No evidence of acute appendicitis.  Mild moderate amount of stool in the colon.  Colonic diverticulosis     Peritoneal Space: No ascites or free air.     Retroperitoneum: No significant adenopathy.     Abdominal wall: Small fat containing umbilical hernia.     Vasculature: No aneurysm.     Bones: No acute fracture. No suspicious lytic or sclerotic lesions.     Impression:     Partial small bowel obstruction with a lead point corresponding to a left inguinal bowel containing hernia.     Mild moderate amount of stool in the colon.     Colonic diverticulosis     Status post cholecystectomy     Fatty infiltration liver mild intrahepatic biliary dilatation     Status post hysterectomy.     All CT scans at this facility use dose modulation, iterative reconstruction, and/or weight based dosing when appropriate to reduce radiation dose to as low as reasonably achievable.        Electronically signed by:Donny Palmer  Date:                                            11/04/2024  Time:                                           20:31

## 2024-11-05 NOTE — HOSPITAL COURSE
While in the emergency room able to reduce the consulted for hernia repair    11/06/2024.  Patient was status post ventral and inguinal hernia repair.  She has mild pain.  She was tolerating clear liquids.  Will advance her diet

## 2024-11-05 NOTE — H&P
[facility]  History & Physical    Patient Name: Lacey Villafana  MRN: [id number]  Patient Class: OP- Observation  Admission Date: 11/4/2024  Attending Physician: Louise Villaseñor MD   Primary Care Provider: Amy Milligan NP         Patient information was obtained from patient and ER records.     Subjective:     Principal Problem:Inguinal hernia    Chief Complaint:   Chief Complaint   Patient presents with    Abdominal Pain     Abd pain hx hernia  cbg enroute [address]        HPI: Lacey Villafana is a 84 year old female  has a past medical history of Hypertension. who went to [facility] ED with c/o left lower abdominal pian that started yesterday evening. She denies N/V. She went to the store and came back, sat outside on her swing and stomach began to hurt. She denies any injury and/or trauma. She decided to take a hot shower to feel better and that's when her and daughter saw the protruding hernia. ED workup showed K+ 2.9. UA nitrite positive, 1+ leukos. CT abdomen/pelvis showed dilated small bowel loops in the mid abdomen measuring up to 3.2 cm consistent with partial small bowel obstruction. Lead point in the bowel containing left inguinal hernia is noted. No evidence of acute appendicitis. Mild moderate amount of stool in the colon. Colonic diverticulosis. Dr. Campos consulted Dr. Dupont (General surgery) who recommended reduction of hernia which was successful in ED. It was recommended to observe overnight for possible complications. Pt admitted to observation for inguinal hernia.     Past Medical History:   Diagnosis Date    Hypertension        Past Surgical History:   Procedure Laterality Date    CHOLECYSTECTOMY      HEMORRHOID SURGERY      HYSTERECTOMY      OOPHORECTOMY         Review of patient's allergies indicates:   Allergen Reactions    Vioxx [rofecoxib]        No current facility-administered medications on file prior to encounter.     Current Outpatient Medications on File  Prior to Encounter   Medication Sig    amLODIPine (NORVASC) 5 MG tablet Take 1 tablet (5 mg total) by mouth once daily.    hydroCHLOROthiazide (HYDRODIURIL) 12.5 MG Tab Take 1 tablet (12.5 mg total) by mouth once daily.    simvastatin (ZOCOR) 40 MG tablet Take 1 tablet (40 mg total) by mouth every evening.    zolpidem (AMBIEN) 5 MG Tab TAKE 1 TABLET BY MOUTH NIGHTLY AS NEEDED    aspirin (ECOTRIN) 81 MG EC tablet 81 mg.    beta-carotene,A,-vits C,E/mins (OCUVITE ORAL) Take 1 tablet by mouth once daily.    COD LIVER OIL ORAL Take 1 tablet by mouth once daily.    glucosamine-chondroitin 500-400 mg tablet Take 1 tablet by mouth 3 (three) times daily.    vitamin D (VITAMIN D3) 1000 units Tab Take 1,000 Units by mouth once daily.     Family History       Problem Relation (Age of Onset)    No Known Problems Mother, Father          Tobacco Use    Smoking status: Never    Smokeless tobacco: Never   Substance and Sexual Activity    Alcohol use: No    Drug use: No    Sexual activity: Not Currently     Partners: Male     Review of Systems   Constitutional:  Positive for activity change. Negative for chills and fever.   HENT:  Negative for trouble swallowing.    Eyes:  Negative for visual disturbance.   Respiratory:  Negative for cough, choking and shortness of breath.    Cardiovascular:  Negative for chest pain and palpitations.   Gastrointestinal:  Positive for abdominal pain (now resolved). Negative for constipation (last BM yesterday, 11/4/24, x 2), diarrhea, nausea and vomiting.   Genitourinary:  Negative for difficulty urinating.   Musculoskeletal:  Negative for arthralgias.   Neurological:  Negative for dizziness, tremors, seizures, syncope, speech difficulty, light-headedness, numbness and headaches.   Psychiatric/Behavioral:  Negative for agitation, behavioral problems and confusion.      Objective:     Vital Signs (Most Recent):  Temp: 97.6 °F (36.4 °C) (11/05/24 0730)  Pulse: 72 (11/05/24 0730)  Resp: 12 (11/05/24  0730)  BP: (!) 167/74 (11/05/24 0730)  SpO2: 98 % (11/05/24 0730) Vital Signs (24h Range):  Temp:  [97.6 °F (36.4 °C)-97.9 °F (36.6 °C)] 97.6 °F (36.4 °C)  Pulse:  [66-93] 72  Resp:  [12-20] 12  SpO2:  [95 %-99 %] 98 %  BP: (134-169)/(63-74) 167/74     Weight: 44.5 kg (98 lb 1.7 oz)  Body mass index is 21.99 kg/m².     Physical Exam  Vitals reviewed.   Constitutional:       General: She is not in acute distress.     Appearance: She is not ill-appearing.   HENT:      Head: Normocephalic.      Mouth/Throat:      Mouth: Mucous membranes are moist.   Eyes:      Extraocular Movements: Extraocular movements intact.   Cardiovascular:      Rate and Rhythm: Normal rate.      Pulses: Normal pulses.   Pulmonary:      Effort: Pulmonary effort is normal. No respiratory distress.      Breath sounds: Normal breath sounds.   Abdominal:      General: Bowel sounds are normal. There is no distension.      Palpations: Abdomen is soft.      Tenderness: There is no abdominal tenderness.   Genitourinary:     Comments: deferred  Musculoskeletal:         General: Normal range of motion.      Cervical back: Normal range of motion.      Right lower leg: No edema.      Left lower leg: No edema.   Skin:     General: Skin is warm.      Capillary Refill: Capillary refill takes less than 2 seconds.   Neurological:      General: No focal deficit present.      Mental Status: She is alert and oriented to person, place, and time.   Psychiatric:         Mood and Affect: Mood normal.         Behavior: Behavior normal.         Thought Content: Thought content normal.                Significant Labs: All pertinent labs within the past 24 hours have been reviewed.  BMP:   Recent Labs   Lab 11/04/24  1849 11/05/24  0246   * 108   * 137   K 2.9* 3.5   CL 99 102   CO2 24 23   BUN 21 12   CREATININE 0.9 0.7   CALCIUM 9.0 9.0   MG 2.1  --      CBC:   Recent Labs   Lab 11/04/24  1849   WBC 10.77   HGB 12.0   HCT 35.2*        CMP:   Recent  Labs   Lab 11/04/24  1849 11/05/24  0246   * 137   K 2.9* 3.5   CL 99 102   CO2 24 23   * 108   BUN 21 12   CREATININE 0.9 0.7   CALCIUM 9.0 9.0   PROT 7.2  --    ALBUMIN 3.9  --    BILITOT 0.4  --    ALKPHOS 73  --    AST 19  --    ALT 13  --    ANIONGAP 12 12     Lactic Acid:   Recent Labs   Lab 11/04/24  1849   LACTATE 2.2     Urine Studies:   Recent Labs   Lab 11/04/24  1836   COLORU Yellow   APPEARANCEUA Hazy*   PHUR 6.0   SPECGRAV 1.025   PROTEINUA Negative   GLUCUA Negative   KETONESU Negative   BILIRUBINUA Negative   OCCULTUA Negative   NITRITE Positive*   UROBILINOGEN Negative   LEUKOCYTESUR 1+*   WBCUA 3   BACTERIA Few*       Significant Imaging: I have reviewed all pertinent imaging results/findings within the past 24 hours.  Assessment/Plan:     * Inguinal hernia  -Transferred from [facility].   -CT abdomen/pelvis showed dilated small bowel loops in the mid abdomen measuring up to 3.2 cm consistent with partial small bowel obstruction. Lead point in the bowel containing left inguinal hernia is noted. No evidence of acute appendicitis. Mild moderate amount of stool in the colon. Colonic diverticulosis   -ED discussed case with Dr. Dupont who recommended reduction of hernia. Dr. Campos successful reduced. She currently denies abdominal pain, no N/V.  -Partial SBO read on imaging -- last BM yesterday, 11/4/24, x 2.   -General surgery consult placed, awaiting input.     UTI (urinary tract infection)       -UA nitrite positive, 1+ leukos, and few bacteria       -Urine culture pending       -Will start IV Ceftriaxone. No previous urine cultures to review.       Hypokalemia  Patient's most recent potassium results are listed below.   Recent Labs     11/04/24 1849 11/05/24  0246   K 2.9* 3.5     Plan  - Replete potassium per protocol  - Monitor potassium Daily  - Patient's hypokalemia is improving  - Hypokalemia likely due to thiazide use    Essential hypertension  Patients blood pressure range in the last  24 hours was: BP  Min: 134/63  Max: 169/73.The patient's inpatient anti-hypertensive regimen is listed below:  Current Antihypertensives  amLODIPine tablet 5 mg, Daily, Oral  hydroCHLOROthiazide tablet 12.5 mg, Daily, Oral    Plan  - BP is controlled, no changes needed to their regimen  - Continue home medications including amlodipine and HCTZ.      VTE Risk Mitigation (From admission, onward)           Ordered     IP VTE HIGH RISK PATIENT  Once         11/05/24 0847     Place sequential compression device  Until discontinued         11/05/24 0847                       On 11/05/2024, patient should be placed in hospital observation services under Dr. Kerns's care in collaboration with Catarina Cramer NP.           AGNES Diallo  Department of Hospital Medicine  '[facility]

## 2024-11-05 NOTE — ANESTHESIA PREPROCEDURE EVALUATION
11/05/2024  Lacey Villafana is a 84 y.o., female.      Pre-op Assessment    I have reviewed the Patient Summary Reports.     I have reviewed the Nursing Notes. I have reviewed the NPO Status.      Review of Systems  Anesthesia Hx:  No problems with previous Anesthesia                Hematology/Oncology:  Hematology Normal   Oncology Normal                                   Renal/:  Renal/ Normal                 Hepatic/GI:  Hepatic/GI Normal                    Neurological:  Neurology Normal                                      Endocrine:  Endocrine Normal            Dermatological:  Skin Normal    Psych:  Psychiatric Normal                    Physical Exam  General: Well nourished, Cooperative, Alert and Oriented    Airway:  Mallampati: II / II  Mouth Opening: Normal  TM Distance: Normal  Tongue: Normal  Neck ROM: Normal ROM    Dental:  Intact      Patient Active Problem List   Diagnosis    Other insomnia    Essential hypertension    Prediabetes    Inguinal hernia    Hypokalemia    UTI (urinary tract infection)    Ventral hernia without obstruction or gangrene           Anesthesia Plan  Type of Anesthesia, risks & benefits discussed:    Anesthesia Type: Gen ETT, Gen Supraglottic Airway  Intra-op Monitoring Plan: Standard ASA Monitors  Post Op Pain Control Plan: multimodal analgesia  Induction:  IV  Airway Plan: Direct  Informed Consent: Informed consent signed with the Patient and all parties understand the risks and agree with anesthesia plan.  All questions answered.   ASA Score: 3  Day of Surgery Review of History & Physical: H&P Update referred to the surgeon/provider.I have interviewed and examined the patient. I have reviewed the patient's H&P dated: There are no significant changes. H&P completed by Anesthesiologist.    Ready For Surgery From Anesthesia Perspective.     .

## 2024-11-05 NOTE — ASSESSMENT & PLAN NOTE
Patients blood pressure range in the last 24 hours was: BP  Min: 134/63  Max: 169/73.The patient's inpatient anti-hypertensive regimen is listed below:  Current Antihypertensives  amLODIPine tablet 5 mg, Daily, Oral  hydroCHLOROthiazide tablet 12.5 mg, Daily, Oral    Plan  - BP is controlled, no changes needed to their regimen  - Continue home medications including amlodipine and HCTZ.

## 2024-11-05 NOTE — CONSULTS
[facility]  General Surgery  Consult Note    Patient Name: Lacey Villafana  MRN: [id number]  Code Status: Full Code  Admission Date: 11/4/2024  Hospital Length of Stay: 0 days  Attending Physician: Rich Kerns DO  Primary Care Provider: Amy Milligan NP    Patient information was obtained from patient, relative(s), past medical records, and ER records.     Inpatient consult to General Surgery  Consult performed by: Shar Berrios MD  Consult ordered by: Catarina Cramer FNP  Reason for consult: Incarcerated inguinal hernia  Assessment/Recommendations: Isolated inguinal hernia and a ventral hernia.      Open repair in the operating room today        Subjective:     Principal Problem: Inguinal hernia    History of Present Illness: 84-year-old female who noticed a bulge in her left groin found to have incarcerated left hernia.  A CT scan was obtained.  The patient was admitted to the hospital Medicine    The patient like us that has been there for many it was not a    No current facility-administered medications on file prior to encounter.     Current Outpatient Medications on File Prior to Encounter   Medication Sig    amLODIPine (NORVASC) 5 MG tablet Take 1 tablet (5 mg total) by mouth once daily.    aspirin (ECOTRIN) 81 MG EC tablet 81 mg.    hydroCHLOROthiazide (HYDRODIURIL) 12.5 MG Tab Take 1 tablet (12.5 mg total) by mouth once daily.    simvastatin (ZOCOR) 40 MG tablet Take 1 tablet (40 mg total) by mouth every evening.    zolpidem (AMBIEN) 5 MG Tab TAKE 1 TABLET BY MOUTH NIGHTLY AS NEEDED    beta-carotene,A,-vits C,E/mins (OCUVITE ORAL) Take 1 tablet by mouth once daily.    COD LIVER OIL ORAL Take 1 tablet by mouth once daily.    glucosamine-chondroitin 500-400 mg tablet Take 1 tablet by mouth 3 (three) times daily.    vitamin D (VITAMIN D3) 1000 units Tab Take 1,000 Units by mouth once daily.       Review of patient's allergies indicates:   Allergen Reactions    Vioxx [rofecoxib]         Past Medical History:   Diagnosis Date    Hypertension      Past Surgical History:   Procedure Laterality Date    CHOLECYSTECTOMY      HEMORRHOID SURGERY      HYSTERECTOMY      OOPHORECTOMY       Family History       Problem Relation (Age of Onset)    No Known Problems Mother, Father          Tobacco Use    Smoking status: Never    Smokeless tobacco: Never   Substance and Sexual Activity    Alcohol use: No    Drug use: No    Sexual activity: Not Currently     Partners: Male     Review of Systems   Constitutional:  Negative for appetite change, chills, fatigue, fever and unexpected weight change.   HENT:  Negative for hearing loss and rhinorrhea.    Eyes:  Negative for visual disturbance.   Respiratory:  Negative for apnea, cough, shortness of breath and wheezing.    Cardiovascular:  Negative for chest pain and palpitations.   Gastrointestinal:  Positive for abdominal pain. Negative for abdominal distention, blood in stool, constipation, diarrhea, nausea and vomiting.        Left groin bulge    Supraumbilical abdominal wall above   Genitourinary:  Negative for dysuria, frequency and urgency.   Musculoskeletal:  Negative for arthralgias and neck pain.   Skin:  Negative for rash.   Neurological:  Negative for seizures, weakness, numbness and headaches.   Hematological:  Negative for adenopathy. Does not bruise/bleed easily.   Psychiatric/Behavioral:  Negative for hallucinations. The patient is not nervous/anxious.      Objective:     Vital Signs (Most Recent):  Temp: 97.6 °F (36.4 °C) (11/05/24 0730)  Pulse: 66 (11/05/24 0920)  Resp: 12 (11/05/24 0730)  BP: (!) 167/74 (11/05/24 0730)  SpO2: 98 % (11/05/24 0730) Vital Signs (24h Range):  Temp:  [97.6 °F (36.4 °C)-97.9 °F (36.6 °C)] 97.6 °F (36.4 °C)  Pulse:  [66-93] 66  Resp:  [12-20] 12  SpO2:  [95 %-99 %] 98 %  BP: (134-169)/(63-74) 167/74     Weight: 44.5 kg (98 lb 1.7 oz)  Body mass index is 21.99 kg/m².     Physical Exam  Vitals reviewed.   Constitutional:        Appearance: She is well-developed.      Comments: Slightly frail   HENT:      Head: Normocephalic.   Eyes:      Pupils: Pupils are equal, round, and reactive to light.   Neck:      Thyroid: No thyromegaly.      Vascular: No JVD.      Trachea: No tracheal deviation.   Cardiovascular:      Rate and Rhythm: Normal rate and regular rhythm.      Heart sounds: Normal heart sounds.   Pulmonary:      Breath sounds: Normal breath sounds. No wheezing.   Abdominal:      General: Bowel sounds are normal. There is no distension.      Palpations: Abdomen is soft. Abdomen is not rigid. There is no mass.      Tenderness: There is no abdominal tenderness. There is no guarding or rebound.      Hernia: A hernia (Inguinal hernia, reducible supraumbilical hernia) is present.   Musculoskeletal:         General: Normal range of motion.   Lymphadenopathy:      Cervical: No cervical adenopathy.   Skin:     General: Skin is warm and dry.      Findings: No erythema or rash.   Neurological:      Mental Status: She is oriented to person, place, and time.            I have reviewed all pertinent lab results within the past 24 hours.  CBC:   Recent Labs   Lab 11/04/24 1849   WBC 10.77   RBC 4.00   HGB 12.0   HCT 35.2*      MCV 88   MCH 30.0   MCHC 34.1     BMP:   Recent Labs   Lab 11/04/24 1849 11/05/24  0246   * 108   * 137   K 2.9* 3.5   CL 99 102   CO2 24 23   BUN 21 12   CREATININE 0.9 0.7   CALCIUM 9.0 9.0   MG 2.1  --      CMP:   Recent Labs   Lab 11/04/24 1849 11/05/24  0246   * 108   CALCIUM 9.0 9.0   ALBUMIN 3.9  --    PROT 7.2  --    * 137   K 2.9* 3.5   CO2 24 23   CL 99 102   BUN 21 12   CREATININE 0.9 0.7   ALKPHOS 73  --    ALT 13  --    AST 19  --    BILITOT 0.4  --      LFTs:   Recent Labs   Lab 11/04/24 1849   ALT 13   AST 19   ALKPHOS 73   BILITOT 0.4   PROT 7.2   ALBUMIN 3.9     Urinalysis was reviewed.  Urinalysis micro was reviewed.  There are no significant numbers of white blood  cells or bacteria     Significant Diagnostics:  I have reviewed all pertinent imaging results/findings within the past 24 hours.  CT: I have reviewed all pertinent results/findings within the past 24 hours and my personal findings are:  Incarcerated left inguinal hernia    Reading Physician Reading Date Result Priority   Donny Palmer MD  [phone] 11/4/2024 STAT     Narrative & Impression  EXAMINATION:  CT ABDOMEN PELVIS WITH IV CONTRAST     CLINICAL HISTORY:  Bowel obstruction suspected;     TECHNIQUE:  Low dose axial images, sagittal and coronal reformations were obtained from the lung bases to the pubic symphysis following the IV administration of 100 mL of Omnipaque 350.     COMPARISON:  None     FINDINGS:  Heart: Normal size as far as seen. No effusion as far as seen.     Lung Bases: Clear.     Liver: Mild intrahepatic biliary dilatation.  No focal lesions.  Fatty infiltration of the liver.     Gallbladder: Status post cholecystectomy.     Bile Ducts: No dilatation.     Pancreas: No mass. No peripancreatic fat stranding.     Spleen: Normal.     Adrenals: Normal.     Kidneys/Ureters: Normal enhancement.  No mass or  hydroureteronephrosis.     Bladder: No wall thickening.     Reproductive organs: Status post hysterectomy.     GI Tract/Mesentery: Dilated small bowel loops in the mid abdomen measuring up to 3.2 cm consistent with partial small bowel obstruction.  Lead point in the bowel containing left inguinal hernia is noted.  No evidence of acute appendicitis.  Mild moderate amount of stool in the colon.  Colonic diverticulosis     Peritoneal Space: No ascites or free air.     Retroperitoneum: No significant adenopathy.     Abdominal wall: Small fat containing umbilical hernia.     Vasculature: No aneurysm.     Bones: No acute fracture. No suspicious lytic or sclerotic lesions.     Impression:     Partial small bowel obstruction with a lead point corresponding to a left inguinal bowel containing hernia.      Mild moderate amount of stool in the colon.     Colonic diverticulosis     Status post cholecystectomy     Fatty infiltration liver mild intrahepatic biliary dilatation     Status post hysterectomy.     All CT scans at this facility use dose modulation, iterative reconstruction, and/or weight based dosing when appropriate to reduce radiation dose to as low as reasonably achievable.        Electronically signed by:Donny Palmer  Date:                                            11/04/2024  Time:                                           20:31    Assessment/Plan:     * Inguinal hernia  Patient had a transient episode of incarceration causing obstruction.  She would benefit from an open hernia repair benefits and complications were    Risk of hernia repair includes infection, bleeding, mesh infection, testicular atrophy, and pain or numbness in the groin that may be long-lasting.      Ventral hernia without obstruction or gangrene  She was reducible supraumbilical fit from open repair with benefits and complications were discussed including infection,, seroma, tolerance of the hernia, and bowel injury    The rationale for using mesh was reviewed with the patient and her family    UTI (urinary tract infection)  She does have a positive leuk esterase but there are no significant cells were bacteria on the Medicine.  This is not a contraindication to hernia repair    Hypokalemia  Management per hospital Medicine    Essential hypertension  Management per hospital med      VTE Risk Mitigation (From admission, onward)           Ordered     IP VTE HIGH RISK PATIENT  Once         11/05/24 0847     Place sequential compression device  Until discontinued         11/05/24 0847                    Thank you for your consult. I will follow-up with patient. Please contact us if you have any additional questions.    Shar Berrios MD  General Surgery  [facility]

## 2024-11-05 NOTE — ANESTHESIA PROCEDURE NOTES
Intubation    Date/Time: 11/5/2024 12:26 PM    Performed by: Samantha Gunderson CRNA  Authorized by: Himanshu Grimm MD    Intubation:     Induction:  Intravenous    Intubated:  Postinduction    Mask Ventilation:  Easy mask    Attempts:  1    Attempted By:  CRNA    Method of Intubation:  Direct    Blade:  Diallo 2    Laryngeal View Grade: Grade I - full view of cords      Difficult Airway Encountered?: No      Complications:  None    Airway Device:  Oral endotracheal tube    Airway Device Size:  7.5    Style/Cuff Inflation:  Cuffed (inflated to minimal occlusive pressure)    Tube secured:  20    Secured at:  The lips    Placement Verified By:  Capnometry    Complicating Factors:  None    Findings Post-Intubation:  BS equal bilateral and atraumatic/condition of teeth unchanged

## 2024-11-05 NOTE — ASSESSMENT & PLAN NOTE
Patient's most recent potassium results are listed below.   Recent Labs     11/04/24  1849 11/05/24  0246   K 2.9* 3.5     Plan  - Replete potassium per protocol  - Monitor potassium Daily  - Patient's hypokalemia is improving  - Hypokalemia likely due to thiazide use

## 2024-11-05 NOTE — HPI
Lacey Villafana is a 84 year old female  has a past medical history of Hypertension. who went to [facility] ED with c/o left lower abdominal pian that started yesterday evening. She denies N/V. She went to the store and came back, sat outside on her swing and stomach began to hurt. She denies any injury and/or trauma. She decided to take a hot shower to feel better and that's when her and daughter saw the protruding hernia. ED workup showed K+ 2.9. UA nitrite positive, 1+ leukos. CT abdomen/pelvis showed dilated small bowel loops in the mid abdomen measuring up to 3.2 cm consistent with partial small bowel obstruction. Lead point in the bowel containing left inguinal hernia is noted. No evidence of acute appendicitis. Mild moderate amount of stool in the colon. Colonic diverticulosis. Dr. Campos consulted Dr. Dupont (General surgery) who recommended reduction of hernia which was successful in ED. It was recommended to observe overnight for possible complications. Pt admitted to observation for inguinal hernia.

## 2024-11-05 NOTE — ASSESSMENT & PLAN NOTE
Patient had a transient episode of incarceration causing obstruction.  She would benefit from an open hernia repair benefits and complications were    Risk of hernia repair includes infection, bleeding, mesh infection, testicular atrophy, and pain or numbness in the groin that may be long-lasting.

## 2024-11-05 NOTE — ED PROVIDER NOTES
Encounter Date: 11/4/2024       History     Chief Complaint   Patient presents with    Abdominal Pain     Abd pain hx hernia  cbg enroute 291     84-year-old female with past medical history of hypertension presents to the emergency department with complaints of left lower quadrant abdominal pain.  Patient says this just started 1-2 hours ago when she got home from the grocery store.  Patient says she feels a lump in the left lower quadrant of her abdomen, and thinks it may be a hernia.  Patient denies any constipation, diarrhea, nausea, vomiting, fever, chills, chest pain, shortness of breath, dysuria, vaginal bleeding.    The history is provided by the patient.     Review of patient's allergies indicates:   Allergen Reactions    Vioxx [rofecoxib]      Past Medical History:   Diagnosis Date    Hypertension      Past Surgical History:   Procedure Laterality Date    CHOLECYSTECTOMY      HEMORRHOID SURGERY      HYSTERECTOMY      OOPHORECTOMY       Family History   Problem Relation Name Age of Onset    No Known Problems Mother      No Known Problems Father       Social History     Tobacco Use    Smoking status: Never    Smokeless tobacco: Never   Substance Use Topics    Alcohol use: No    Drug use: No     Review of Systems   Constitutional:  Negative for diaphoresis and fever.   HENT:  Negative for congestion, dental problem and sore throat.    Eyes:  Negative for pain and visual disturbance.   Respiratory:  Negative for cough and shortness of breath.    Cardiovascular:  Negative for chest pain and palpitations.   Gastrointestinal:  Positive for abdominal pain. Negative for diarrhea, nausea and vomiting.   Genitourinary:  Negative for dysuria and flank pain.   Musculoskeletal:  Negative for back pain and neck pain.   Skin:  Negative for rash and wound.   Neurological:  Negative for weakness, numbness and headaches.   Psychiatric/Behavioral:  Negative for agitation and confusion.        Physical Exam     Initial Vitals  "[11/04/24 1814]   BP Pulse Resp Temp SpO2   (!) 150/71 79 16 97.9 °F (36.6 °C) 98 %      MAP       --         Physical Exam    Constitutional: She appears well-developed and well-nourished.   HENT:   Head: Normocephalic and atraumatic.   Eyes: EOM are normal. Pupils are equal, round, and reactive to light.   Neck: Neck supple.   Normal range of motion.  Cardiovascular:  Normal rate and regular rhythm.           Pulmonary/Chest: Breath sounds normal. No respiratory distress.   Abdominal: She exhibits no distension. There is abdominal tenderness.   There is a palpable nodular firm mass in the left lower quadrant that is tender, possibly a hernia. There is a soft, easily reducible hernia in the anterior abdominal wall.    Musculoskeletal:      Cervical back: Normal range of motion and neck supple.     Neurological: She is alert and oriented to person, place, and time. She has normal strength. No sensory deficit.   Skin: Skin is warm and dry.   Psychiatric: She has a normal mood and affect.         ED Course   Left inguinal hernia reduction    Date/Time: 11/4/2024 8:48 PM    Performed by: Christiano Campos MD  Authorized by: Rich Kerns DO  Consent Done: Yes  Consent: Verbal consent obtained.  Risks and benefits: risks, benefits and alternatives were discussed  Consent given by: patient  Patient understanding: patient states understanding of the procedure being performed  Test results: test results available and properly labeled  Site marked: the operative site was marked  Imaging studies: imaging studies available  Required items: required blood products, implants, devices, and special equipment available  Patient identity confirmed: verbally with patient  Time out: Immediately prior to procedure a "time out" was called to verify the correct patient, procedure, equipment, support staff and site/side marked as required.  Intake: Left inguinal region.  Patient restrained: no  Patient cooperative: yes  Complexity: " simple  Patient tolerance: Patient tolerated the procedure well with no immediate complications  Comments: Placed in trendelenburg. IV pain medication given. Left inguinal hernia was palpated, and then gentle pressure was applied. Hernia was guided back into the abdominal cavity. No complications.       Labs Reviewed   CBC W/ AUTO DIFFERENTIAL - Abnormal       Result Value    WBC 10.77      RBC 4.00      Hemoglobin 12.0      Hematocrit 35.2 (*)     MCV 88      MCH 30.0      MCHC 34.1      RDW 13.3      Platelets 231      MPV 9.7      Immature Granulocytes 0.5      Gran # (ANC) 8.9 (*)     Immature Grans (Abs) 0.05 (*)     Lymph # 1.0      Mono # 0.8      Eos # 0.0      Baso # 0.06      nRBC 0      Gran % 82.7 (*)     Lymph % 8.8 (*)     Mono % 7.0      Eosinophil % 0.4      Basophil % 0.6      Differential Method Automated      Narrative:     Release to patient->Immediate   COMPREHENSIVE METABOLIC PANEL - Abnormal    Sodium 135 (*)     Potassium 2.9 (*)     Chloride 99      CO2 24      Glucose 204 (*)     BUN 21      Creatinine 0.9      Calcium 9.0      Total Protein 7.2      Albumin 3.9      Total Bilirubin 0.4      Alkaline Phosphatase 73      AST 19      ALT 13      eGFR >60.0      Anion Gap 12      Narrative:     Release to patient->Immediate   URINALYSIS, REFLEX TO URINE CULTURE - Abnormal    Specimen UA Urine, Clean Catch      Color, UA Yellow      Appearance, UA Hazy (*)     pH, UA 6.0      Specific Gravity, UA 1.025      Protein, UA Negative      Glucose, UA Negative      Ketones, UA Negative      Bilirubin (UA) Negative      Occult Blood UA Negative      Nitrite, UA Positive (*)     Urobilinogen, UA Negative      Leukocytes, UA 1+ (*)     Narrative:     Specimen Source->Urine   URINALYSIS MICROSCOPIC - Abnormal    WBC, UA 3      Bacteria Few (*)     Microscopic Comment SEE COMMENT      Narrative:     Specimen Source->Urine   HIV 1 / 2 ANTIBODY    HIV 1/2 Ag/Ab Negative      Narrative:     Release to  patient->Immediate   HEPATITIS C ANTIBODY    Hepatitis C Ab Negative      Narrative:     Release to patient->Immediate   HEP C VIRUS HOLD SPECIMEN    HEP C Virus Hold Specimen Hold for HCV sendout      Narrative:     Release to patient->Immediate   LACTIC ACID, PLASMA    Lactate (Lactic Acid) 2.2     MAGNESIUM   MAGNESIUM    Magnesium 2.1      Narrative:     Release to patient->Immediate   BASIC METABOLIC PANEL    Sodium 137      Potassium 3.5      Chloride 102      CO2 23      Glucose 108      BUN 12      Creatinine 0.7      Calcium 9.0      Anion Gap 12      eGFR >60.0       EKG Readings: (Independently Interpreted)   Rate of 72 beats per minute.  Normal sinus rhythm.  Normal axis.  P.r., QRS and QTC intervals within normal limits.  No STEMI.     ECG Results              EKG 12-lead (Final result)        Collection Time Result Time QRS Duration [facility] QTC Calculation    11/04/24 18:35:25 11/05/24 15:11:52 80 424                     Final result by Interface, Lab In [facility] (11/05/24 15:12:01)                   Narrative:    Test Reason : R10.9,    Vent. Rate : 072 BPM     Atrial Rate : 072 BPM     P-R Int : 192 ms          QRS Dur : 080 ms      QT Int : 388 ms       P-R-T Axes : 052 -17 043 degrees     QTc Int : 424 ms    Normal sinus rhythm  Minimal voltage criteria for LVH, may be normal variant ( R in aVL )  Borderline Abnormal ECG  When compared with ECG of 10-AUG-2016 20:35,  No significant change was found  Confirmed by TODD KEENAN, KOMAL CARBONE ([id number]) on 11/5/2024 3:11:48 PM    Referred By: [email or web address]   SELF           Confirmed By:KOMAL BROOKS MD                                  Imaging Results              CT Abdomen Pelvis With IV Contrast Routine Oral Contrast (Final result)  Result time 11/04/24 20:31:56      Final result by Donny Palmer MD (11/04/24 20:31:56)                   Impression:      Partial small bowel obstruction with a lead point corresponding to a left inguinal bowel containing  hernia.    Mild moderate amount of stool in the colon.    Colonic diverticulosis    Status post cholecystectomy    Fatty infiltration liver mild intrahepatic biliary dilatation    Status post hysterectomy.    All CT scans at this facility use dose modulation, iterative reconstruction, and/or weight based dosing when appropriate to reduce radiation dose to as low as reasonably achievable.      Electronically signed by: Donny Estela  Date:    11/04/2024  Time:    20:31               Narrative:    EXAMINATION:  CT ABDOMEN PELVIS WITH IV CONTRAST    CLINICAL HISTORY:  Bowel obstruction suspected;    TECHNIQUE:  Low dose axial images, sagittal and coronal reformations were obtained from the lung bases to the pubic symphysis following the IV administration of 100 mL of Omnipaque 350.    COMPARISON:  None    FINDINGS:  Heart: Normal size as far as seen. No effusion as far as seen.    Lung Bases: Clear.    Liver: Mild intrahepatic biliary dilatation.  No focal lesions.  Fatty infiltration of the liver.    Gallbladder: Status post cholecystectomy.    Bile Ducts: No dilatation.    Pancreas: No mass. No peripancreatic fat stranding.    Spleen: Normal.    Adrenals: Normal.    Kidneys/Ureters: Normal enhancement.  No mass or  hydroureteronephrosis.    Bladder: No wall thickening.    Reproductive organs: Status post hysterectomy.    GI Tract/Mesentery: Dilated small bowel loops in the mid abdomen measuring up to 3.2 cm consistent with partial small bowel obstruction.  Lead point in the bowel containing left inguinal hernia is noted.  No evidence of acute appendicitis.  Mild moderate amount of stool in the colon.  Colonic diverticulosis    Peritoneal Space: No ascites or free air.    Retroperitoneum: No significant adenopathy.    Abdominal wall: Small fat containing umbilical hernia.    Vasculature: No aneurysm.    Bones: No acute fracture. No suspicious lytic or sclerotic lesions.                                        Medications   amLODIPine tablet 5 mg (5 mg Oral Given 11/5/24 0959)   hydroCHLOROthiazide tablet 12.5 mg (12.5 mg Oral Given 11/5/24 0959)   atorvastatin tablet 20 mg (has no administration in time range)   zolpidem tablet 5 mg (has no administration in time range)   sodium chloride 0.9% flush 10 mL (has no administration in time range)   ondansetron injection 4 mg (has no administration in time range)   acetaminophen tablet 650 mg (has no administration in time range)   cefTRIAXone injection 1 g (1 g Intravenous Given 11/5/24 0959)   lactated ringers infusion ( Intravenous Verify Only 11/5/24 1748)   chlorhexidine 0.12 % solution 10 mL (has no administration in time range)   enoxaparin injection 40 mg (40 mg Subcutaneous Given 11/5/24 1601)   HYDROmorphone injection 0.5 mg (has no administration in time range)   ondansetron disintegrating tablet 8 mg (has no administration in time range)   HYDROcodone-acetaminophen 5-325 mg per tablet 1 tablet (has no administration in time range)   HYDROcodone-acetaminophen 7.5-325 mg per tablet 1 tablet (has no administration in time range)   HYDROmorphone injection 0.5 mg (0.5 mg Intravenous Given 11/4/24 1919)   ondansetron injection 4 mg (4 mg Intravenous Given 11/4/24 1920)   ondansetron injection 4 mg (0 mg Intravenous [facility] 2019)   magnesium sulfate 2g in water 50mL IVPB (premix) (0 g Intravenous Stopped 11/4/24 2335)   potassium chloride 10 mEq in 100 mL IVPB (0 mEq Intravenous Stopped 11/4/24 2335)   iohexoL (OMNIPAQUE 350) injection 75 mL (75 mLs Intravenous Given 11/4/24 2026)   iohexoL (OMNIPAQUE 12) oral solution 1,000 mL (1,000 mLs Oral Given 11/4/24 2026)   0.9%  NaCl infusion (0 mLs Intravenous Stopped 11/4/24 2335)   HYDROmorphone injection 0.5 mg (0.5 mg Intravenous Given 11/4/24 2054)   lactated ringers bolus 1,000 mL (0 mLs Intravenous Stopped 11/5/24 0403)     Medical Decision Making  DDx includes hernia, mass, diverticulitis    Amount  and/or Complexity of Data Reviewed  Labs: ordered. Decision-making details documented in ED Course.  Radiology: ordered. Decision-making details documented in ED Course.  ECG/medicine tests: ordered and independent interpretation performed. Decision-making details documented in ED Course.    Risk  Prescription drug management.               ED Course as of 11/05/24 1809   Mon Nov 04, 2024 2048 I discussed case with Dr. Garibay, and she recommended hernia reduction. I placed the patient in trendelenburg, and used gentle pressure to reduce left inguinal hernia. Pain improved. Will place in obs.  [BA]   2055 All historical, clinical, radiographic, and laboratory findings were reviewed with the patient/family in detail along with the indications for transport to the facility in [place] in order to receive hospitalist and general surgery evaluation and observation.  All remaining questions and concerns were addressed at this time and the patient/family communicates understanding and agrees to proceed accordingly.  Similarly, all pertinent details of the encounter were discussed with Dr. Villaseñor () and Mi Hunt NP, who agrees to receive the patient at [facility] for further care as outlined above.  The patient will be transferred by [facility] ambulance services secondary to a need for ongoing telemetry monitoring en route.  Christiano Campos MD  8:55 PM       [BA]   Tue Nov 05, 2024   0334 K is now 3.5, will downgrade to M/S bed [BA]      ED Course User Index  [BA] Christiano Campos MD                           Clinical Impression:  Final diagnoses:  [R10.9] Abdominal pain  [K40.30] Intestinal obstruction without gangrene due to left inguinal hernia (Primary)  [E87.6] Hypokalemia          ED Disposition Condition    Observation Stable                Christiano Campos MD  11/05/24 1814

## 2024-11-05 NOTE — ASSESSMENT & PLAN NOTE
-Transferred from [facility].   -CT abdomen/pelvis showed dilated small bowel loops in the mid abdomen measuring up to 3.2 cm consistent with partial small bowel obstruction. Lead point in the bowel containing left inguinal hernia is noted. No evidence of acute appendicitis. Mild moderate amount of stool in the colon. Colonic diverticulosis   -ED discussed case with Dr. Dupont who recommended reduction of hernia. Dr. Campos successful reduced. She currently denies abdominal pain, no N/V.  -Partial SBO read on imaging -- last BM yesterday, 11/4/24, x 2.   -General surgery consult placed, awaiting input.

## 2024-11-05 NOTE — PLAN OF CARE
Discussed poc with pt, pt verbalized understanding     Purposeful rounding every 2hours     VS wnl  Cardiac monitoring in use, pt is NSR, tele monitor #[id number]  Fall precautions in place, remains injury free     IVFs LR at 75 ml/hr  Abx given as prescribed  Educated and demonstrated use of  incentive spirometer   SCD in use  Ambulated to the bathroom  Bed locked at lowest position  Call light within reach     Chart check complete  Will cont with POC

## 2024-11-05 NOTE — ASSESSMENT & PLAN NOTE
She does have a positive leuk esterase but there are no significant cells were bacteria on the Medicine.  This is not a contraindication to hernia repair

## 2024-11-05 NOTE — TRANSFER OF CARE
"Anesthesia Transfer of Care Note    Patient: Lacey Villafana    Procedure(s) Performed: Procedure(s) (LRB):  REPAIR, HERNIA, INGUINAL (Left)  REPAIR, HERNIA, VENTRAL (N/A)    Patient location: PACU    Anesthesia Type: general    Transport from OR: Transported from OR on room air with adequate spontaneous ventilation    Post pain: adequate analgesia    Post assessment: tolerated procedure well and no apparent anesthetic complications    Post vital signs: stable    Level of consciousness: sedated and responds to stimulation    Nausea/Vomiting: no nausea/vomiting    Complications: none    Transfer of care protocol was followed      Last vitals: Visit Vitals  BP (!) 167/74   Pulse 78   Temp 36.4 °C (97.6 °F) (Oral)   Resp 12   Ht 4' 8" (1.422 m)   Wt 44.5 kg (98 lb 1.7 oz)   SpO2 98%   Breastfeeding No   BMI 21.99 kg/m²     "

## 2024-11-05 NOTE — SUBJECTIVE & OBJECTIVE
Past Medical History:   Diagnosis Date    Hypertension        Past Surgical History:   Procedure Laterality Date    CHOLECYSTECTOMY      HEMORRHOID SURGERY      HYSTERECTOMY      OOPHORECTOMY         Review of patient's allergies indicates:   Allergen Reactions    Vioxx [rofecoxib]        No current facility-administered medications on file prior to encounter.     Current Outpatient Medications on File Prior to Encounter   Medication Sig    amLODIPine (NORVASC) 5 MG tablet Take 1 tablet (5 mg total) by mouth once daily.    hydroCHLOROthiazide (HYDRODIURIL) 12.5 MG Tab Take 1 tablet (12.5 mg total) by mouth once daily.    simvastatin (ZOCOR) 40 MG tablet Take 1 tablet (40 mg total) by mouth every evening.    zolpidem (AMBIEN) 5 MG Tab TAKE 1 TABLET BY MOUTH NIGHTLY AS NEEDED    aspirin (ECOTRIN) 81 MG EC tablet 81 mg.    beta-carotene,A,-vits C,E/mins (OCUVITE ORAL) Take 1 tablet by mouth once daily.    COD LIVER OIL ORAL Take 1 tablet by mouth once daily.    glucosamine-chondroitin 500-400 mg tablet Take 1 tablet by mouth 3 (three) times daily.    vitamin D (VITAMIN D3) 1000 units Tab Take 1,000 Units by mouth once daily.     Family History       Problem Relation (Age of Onset)    No Known Problems Mother, Father          Tobacco Use    Smoking status: Never    Smokeless tobacco: Never   Substance and Sexual Activity    Alcohol use: No    Drug use: No    Sexual activity: Not Currently     Partners: Male     Review of Systems   Constitutional:  Positive for activity change. Negative for chills and fever.   HENT:  Negative for trouble swallowing.    Eyes:  Negative for visual disturbance.   Respiratory:  Negative for cough, choking and shortness of breath.    Cardiovascular:  Negative for chest pain and palpitations.   Gastrointestinal:  Positive for abdominal pain (now resolved). Negative for constipation (last BM yesterday, 11/4/24, x 2), diarrhea, nausea and vomiting.   Genitourinary:  Negative for difficulty  urinating.   Musculoskeletal:  Negative for arthralgias.   Neurological:  Negative for dizziness, tremors, seizures, syncope, speech difficulty, light-headedness, numbness and headaches.   Psychiatric/Behavioral:  Negative for agitation, behavioral problems and confusion.      Objective:     Vital Signs (Most Recent):  Temp: 97.6 °F (36.4 °C) (11/05/24 0730)  Pulse: 72 (11/05/24 0730)  Resp: 12 (11/05/24 0730)  BP: (!) 167/74 (11/05/24 0730)  SpO2: 98 % (11/05/24 0730) Vital Signs (24h Range):  Temp:  [97.6 °F (36.4 °C)-97.9 °F (36.6 °C)] 97.6 °F (36.4 °C)  Pulse:  [66-93] 72  Resp:  [12-20] 12  SpO2:  [95 %-99 %] 98 %  BP: (134-169)/(63-74) 167/74     Weight: 44.5 kg (98 lb 1.7 oz)  Body mass index is 21.99 kg/m².     Physical Exam  Vitals reviewed.   Constitutional:       General: She is not in acute distress.     Appearance: She is not ill-appearing.   HENT:      Head: Normocephalic.      Mouth/Throat:      Mouth: Mucous membranes are moist.   Eyes:      Extraocular Movements: Extraocular movements intact.   Cardiovascular:      Rate and Rhythm: Normal rate.      Pulses: Normal pulses.   Pulmonary:      Effort: Pulmonary effort is normal. No respiratory distress.      Breath sounds: Normal breath sounds.   Abdominal:      General: Bowel sounds are normal. There is no distension.      Palpations: Abdomen is soft.      Tenderness: There is no abdominal tenderness.   Genitourinary:     Comments: deferred  Musculoskeletal:         General: Normal range of motion.      Cervical back: Normal range of motion.      Right lower leg: No edema.      Left lower leg: No edema.   Skin:     General: Skin is warm.      Capillary Refill: Capillary refill takes less than 2 seconds.   Neurological:      General: No focal deficit present.      Mental Status: She is alert and oriented to person, place, and time.   Psychiatric:         Mood and Affect: Mood normal.         Behavior: Behavior normal.         Thought Content: Thought  content normal.                Significant Labs: All pertinent labs within the past 24 hours have been reviewed.  BMP:   Recent Labs   Lab 11/04/24 1849 11/05/24  0246   * 108   * 137   K 2.9* 3.5   CL 99 102   CO2 24 23   BUN 21 12   CREATININE 0.9 0.7   CALCIUM 9.0 9.0   MG 2.1  --      CBC:   Recent Labs   Lab 11/04/24  1849   WBC 10.77   HGB 12.0   HCT 35.2*        CMP:   Recent Labs   Lab 11/04/24  1849 11/05/24  0246   * 137   K 2.9* 3.5   CL 99 102   CO2 24 23   * 108   BUN 21 12   CREATININE 0.9 0.7   CALCIUM 9.0 9.0   PROT 7.2  --    ALBUMIN 3.9  --    BILITOT 0.4  --    ALKPHOS 73  --    AST 19  --    ALT 13  --    ANIONGAP 12 12     Lactic Acid:   Recent Labs   Lab 11/04/24  1849   LACTATE 2.2     Urine Studies:   Recent Labs   Lab 11/04/24  1836   COLORU Yellow   APPEARANCEUA Hazy*   PHUR 6.0   SPECGRAV 1.025   PROTEINUA Negative   GLUCUA Negative   KETONESU Negative   BILIRUBINUA Negative   OCCULTUA Negative   NITRITE Positive*   UROBILINOGEN Negative   LEUKOCYTESUR 1+*   WBCUA 3   BACTERIA Few*       Significant Imaging: I have reviewed all pertinent imaging results/findings within the past 24 hours.

## 2024-11-05 NOTE — PLAN OF CARE
[facility] (Hospital)  Discharge Assessment    Primary Care Provider: Amy Milligan NP     Discharge Assessment (most recent)       BRIEF DISCHARGE ASSESSMENT - 11/05/24 1250          Discharge Planning    Assessment Type Discharge Planning Brief Assessment     Resource/Environmental Concerns none     Support Systems Children     Current Living Arrangements home     Patient/Family Anticipates Transition to home     DME Needed Upon Discharge  none     Discharge Plan A Home                     Unable to complete full assessment due to pt being out of the room and daughter not answering.

## 2024-11-05 NOTE — HPI
84-year-old female who noticed a bulge in her left groin found to have incarcerated left hernia.  A CT scan was obtained.  The patient was admitted to the hospital Medicine    The patient like us that has been there for many it was not a

## 2024-11-05 NOTE — DISCHARGE INSTRUCTIONS
Please call for any fever, increase in pain, nausea or vomiting or redness or drainage from incision(s).    No lifting more than 30 pounds for 2 weeks    No driving if taking the pain medicine    May shower     Removed the glue when it becomes loose, this usually takes 10-14 days.      You may want to take a stool softener or Glycolax or MiraLax while taking pain medicine to avoid constipation    If you become constipated from the pain medication you can use a double dose of Miralax or Glycolax, or milk of magnesia for severe constipation.    Our office phone numbers are  [phone] and     Our office fax  number is #[phone]      -You were admitted to our hospital for treatment of inguinal hernia. Over the course of your hospitalization, our general surgery team treated you    -Please read the attached information regarding hernia repair and go to your nearest ED if any of the alarm/concerning signs develop.    -We have placed a referral to General surgery.  Please give our scheduling line a call at [phone] to schedule an appointment with them if it has not been setup already.    -Your labs remained stable, but some labs still remained abnormal. As we discussed, it is extremely important for you to followup with a primary care physician within 1 week for repeat lab work to ensure normalization, follow up of pending labs, medication adjustments, routine post-discharge care, and any other evaluations as necessitated.

## 2024-11-05 NOTE — OP NOTE
Count includes the [facility] - Surgery ([facility])  Surgery Department  Operative Note    SUMMARY     Date of Procedure: 11/5/2024     Procedure: Procedure(s) (LRB):  REPAIR, HERNIA, INGUINAL (Left)  REPAIR, HERNIA, VENTRAL (N/A)     Surgeons and Role:     * Shar Berrios MD - Primary    Assisting Surgeon: None    Pre-Operative Diagnosis: Incarcerated inguinal hernia [K40.30]    Post-Operative Diagnosis: Post-Op Diagnosis Codes:     * Incarcerated inguinal hernia [K40.30]    Anesthesia: General    Operative Findings (including complications, if any):     2 cm supraumbilical hernia   Indirect left inguinal hernia    Description of Technical Procedures:       The patient was brought in the operative room placed on the operative table supine position.  General endotracheal anesthesia was induced.  IV antibiotics were administered pneumatic compressions on lower extremities.  The abdomen was prepped and draped to the standard fashion including the mid thigh on the left.      A time-out was performed.      A supraumbilical incision was made.  The fascia was identified after dissection of the subcutaneous tissue.  The hernia sac was identified just above the umbilicus and dissected free.  The hernia sac was then excised at the level of the fascia.      The hernia was repaired by in laying a 6.4 cm composite [place] of mesh.  The mesh was secured with 4-0 Vicryl sutures in interrupted fashion.  The fascia was then closed with 0 Vicryl in a running fashion.  Marcaine was infiltrated.  Ilene's fascia was closed with 2-0 Vicryl in interrupted fashion.  Marcaine was infiltrated The deep dermis was closed with 3-0 Vicryl in interrupted fashion.  The skin was closed with 4-0 Monocryl.      A left inguinal incision was made.  Subcutaneous tissues were dissected using electrocautery.  The external oblique aponeurosis was identified and cleared of subcutaneous tissue.  The aponeurosis of the external oblique was opened in the direction of its fibers  through the external ring.  The undersurface of the external ring was cleared from the area of the round ligament.  The ileal inguinal nerve was identified and left adherent medially.  The hernia sac was identified and dissected free from the round ligament.  The round ligament was clipped and divided.      The hernia was repaired by reducing the internal hernia and holding in place with a small plug of  mesh.  The mesh was secured with 0 Vicryl suture in interrupted fashion x5.  The floor was then reinforced with a sheet of polypropylene mesh.  The mesh was secured to the pubic tubercle inferiorly.  The lateral edge of the mesh was secured to the shelving edge of the inguinal ligament in a running fashion.  The medial edge of the mesh was secured to the medial musculature in and interrupted fashion.      Marcaine was infiltrated.  The aponeurosis of the external oblique was closed with 0 Vicryl in a running fashion.  Additional Marcaine was infiltrated.      The wound was irrigated.  Ilene's fascia was closed with 2-0 Vicryl in interrupted fashion.  The deep dermis was closed with 3-0 Vicryl in interrupted fashion.  The skin was closed with 4-0 Monocryl in a subcuticular manner.      Dermabond was placed.      Final counts were correct    Patient tolerated procedure well    Significant Surgical Tasks Conducted by the Assistant(s), if Applicable:  None    Estimated Blood Loss (EBL): * 15 mL   IV fluids 500 mL   Marcaine 0.25% 30 mL           Implants:   Implant Name Type Inv. Item Serial No.  Lot No. LRB No. Used Action   PATCH HERNIA MESH VENTRALEX - [id number]  PATCH HERNIA MESH VENTRALEX  [employer] [place] [id number] N/A 1 Implanted   PLUG SMALL - [id number]  PLUG SMALL  [employer] [id number] 1 Implanted       Specimens:   Specimen (24h ago, onward)      None                    Condition: Stable    Disposition: PACU - hemodynamically stable.    Attestation: Op Note Attestation: I performed the procedure.

## 2024-11-05 NOTE — ASSESSMENT & PLAN NOTE
-UA nitrite positive, 1+ leukos, and few bacteria  -Urine culture pending  -Will start IV Ceftriaxone. No previous urine cultures to review.

## 2024-11-06 VITALS
WEIGHT: 98.13 LBS | DIASTOLIC BLOOD PRESSURE: 63 MMHG | RESPIRATION RATE: 18 BRPM | TEMPERATURE: 98 F | HEIGHT: 56 IN | OXYGEN SATURATION: 97 % | SYSTOLIC BLOOD PRESSURE: 136 MMHG | HEART RATE: 84 BPM | BODY MASS INDEX: 22.07 KG/M2

## 2024-11-06 LAB
ALBUMIN SERPL BCP-MCNC: 3.3 G/DL (ref 3.5–5.2)
ALP SERPL-CCNC: 78 U/L (ref 40–150)
ALT SERPL W/O P-5'-P-CCNC: 14 U/L (ref 10–44)
ANION GAP SERPL CALC-SCNC: 10 MMOL/L (ref 8–16)
ANION GAP SERPL CALC-SCNC: 9 MMOL/L (ref 8–16)
AST SERPL-CCNC: 31 U/L (ref 10–40)
BASOPHILS # BLD AUTO: 0.01 K/UL (ref 0–0.2)
BASOPHILS NFR BLD: 0.1 % (ref 0–1.9)
BILIRUB SERPL-MCNC: 0.5 MG/DL (ref 0.1–1)
BUN SERPL-MCNC: 14 MG/DL (ref 8–23)
BUN SERPL-MCNC: 14 MG/DL (ref 8–23)
CALCIUM SERPL-MCNC: 8.6 MG/DL (ref 8.7–10.5)
CALCIUM SERPL-MCNC: 8.8 MG/DL (ref 8.7–10.5)
CHLORIDE SERPL-SCNC: 103 MMOL/L (ref 95–110)
CHLORIDE SERPL-SCNC: 104 MMOL/L (ref 95–110)
CO2 SERPL-SCNC: 25 MMOL/L (ref 23–29)
CO2 SERPL-SCNC: 26 MMOL/L (ref 23–29)
CREAT SERPL-MCNC: 0.7 MG/DL (ref 0.5–1.4)
CREAT SERPL-MCNC: 0.8 MG/DL (ref 0.5–1.4)
DIFFERENTIAL METHOD BLD: ABNORMAL
EOSINOPHIL # BLD AUTO: 0 K/UL (ref 0–0.5)
EOSINOPHIL NFR BLD: 0 % (ref 0–8)
ERYTHROCYTE [DISTWIDTH] IN BLOOD BY AUTOMATED COUNT: 13.2 % (ref 11.5–14.5)
ERYTHROCYTE [DISTWIDTH] IN BLOOD BY AUTOMATED COUNT: 13.2 % (ref 11.5–14.5)
EST. GFR  (NO RACE VARIABLE): >60 ML/MIN/1.73 M^2
EST. GFR  (NO RACE VARIABLE): >60 ML/MIN/1.73 M^2
GLUCOSE SERPL-MCNC: 105 MG/DL (ref 70–110)
GLUCOSE SERPL-MCNC: 106 MG/DL (ref 70–110)
HCT VFR BLD AUTO: 33.6 % (ref 37–48.5)
HCT VFR BLD AUTO: 34.7 % (ref 37–48.5)
HGB BLD-MCNC: 11.4 G/DL (ref 12–16)
HGB BLD-MCNC: 11.5 G/DL (ref 12–16)
IMM GRANULOCYTES # BLD AUTO: 0.05 K/UL (ref 0–0.04)
IMM GRANULOCYTES NFR BLD AUTO: 0.4 % (ref 0–0.5)
LYMPHOCYTES # BLD AUTO: 0.9 K/UL (ref 1–4.8)
LYMPHOCYTES NFR BLD: 7.3 % (ref 18–48)
MCH RBC QN AUTO: 29.3 PG (ref 27–31)
MCH RBC QN AUTO: 29.7 PG (ref 27–31)
MCHC RBC AUTO-ENTMCNC: 33.1 G/DL (ref 32–36)
MCHC RBC AUTO-ENTMCNC: 33.9 G/DL (ref 32–36)
MCV RBC AUTO: 88 FL (ref 82–98)
MCV RBC AUTO: 88 FL (ref 82–98)
MONOCYTES # BLD AUTO: 0.9 K/UL (ref 0.3–1)
MONOCYTES NFR BLD: 8.1 % (ref 4–15)
NEUTROPHILS # BLD AUTO: 9.8 K/UL (ref 1.8–7.7)
NEUTROPHILS NFR BLD: 84.1 % (ref 38–73)
NRBC BLD-RTO: 0 /100 WBC
PLATELET # BLD AUTO: 236 K/UL (ref 150–450)
PLATELET # BLD AUTO: 241 K/UL (ref 150–450)
PMV BLD AUTO: 10.2 FL (ref 9.2–12.9)
PMV BLD AUTO: 10.4 FL (ref 9.2–12.9)
POTASSIUM SERPL-SCNC: 3.4 MMOL/L (ref 3.5–5.1)
POTASSIUM SERPL-SCNC: 3.4 MMOL/L (ref 3.5–5.1)
PROT SERPL-MCNC: 6.3 G/DL (ref 6–8.4)
RBC # BLD AUTO: 3.84 M/UL (ref 4–5.4)
RBC # BLD AUTO: 3.93 M/UL (ref 4–5.4)
SODIUM SERPL-SCNC: 138 MMOL/L (ref 136–145)
SODIUM SERPL-SCNC: 139 MMOL/L (ref 136–145)
WBC # BLD AUTO: 11.63 K/UL (ref 3.9–12.7)
WBC # BLD AUTO: 11.95 K/UL (ref 3.9–12.7)

## 2024-11-06 PROCEDURE — 63600175 PHARM REV CODE 636 W HCPCS: Mod: HCWC | Performed by: SURGERY

## 2024-11-06 PROCEDURE — 25000003 PHARM REV CODE 250: Mod: HCWC | Performed by: STUDENT IN AN ORGANIZED HEALTH CARE EDUCATION/TRAINING PROGRAM

## 2024-11-06 PROCEDURE — 25000003 PHARM REV CODE 250: Mod: HCWC | Performed by: SURGERY

## 2024-11-06 PROCEDURE — 94799 UNLISTED PULMONARY SVC/PX: CPT | Mod: HCWC,XB

## 2024-11-06 PROCEDURE — 80053 COMPREHEN METABOLIC PANEL: CPT | Mod: HCWC | Performed by: SURGERY

## 2024-11-06 PROCEDURE — 80048 BASIC METABOLIC PNL TOTAL CA: CPT | Mod: HCWC,XB | Performed by: SURGERY

## 2024-11-06 PROCEDURE — 85025 COMPLETE CBC W/AUTO DIFF WBC: CPT | Mod: HCWC | Performed by: SURGERY

## 2024-11-06 PROCEDURE — 85027 COMPLETE CBC AUTOMATED: CPT | Mod: HCWC | Performed by: SURGERY

## 2024-11-06 RX ORDER — CEPHALEXIN 500 MG/1
500 CAPSULE ORAL EVERY 12 HOURS
Qty: 6 CAPSULE | Refills: 0 | Status: SHIPPED | OUTPATIENT
Start: 2024-11-06 | End: 2024-11-09

## 2024-11-06 RX ADMIN — CHLORHEXIDINE GLUCONATE 0.12% ORAL RINSE 10 ML: 1.2 LIQUID ORAL at 08:11

## 2024-11-06 RX ADMIN — POTASSIUM BICARBONATE 50 MEQ: 978 TABLET, EFFERVESCENT ORAL at 08:11

## 2024-11-06 RX ADMIN — HYDROCHLOROTHIAZIDE 12.5 MG: 12.5 TABLET ORAL at 08:11

## 2024-11-06 RX ADMIN — HYDROMORPHONE HYDROCHLORIDE 0.5 MG: 1 INJECTION, SOLUTION INTRAMUSCULAR; INTRAVENOUS; SUBCUTANEOUS at 01:11

## 2024-11-06 RX ADMIN — CEFTRIAXONE 1 G: 1 INJECTION, POWDER, FOR SOLUTION INTRAMUSCULAR; INTRAVENOUS at 09:11

## 2024-11-06 RX ADMIN — HYDROMORPHONE HYDROCHLORIDE 0.5 MG: 1 INJECTION, SOLUTION INTRAMUSCULAR; INTRAVENOUS; SUBCUTANEOUS at 07:11

## 2024-11-06 RX ADMIN — AMLODIPINE BESYLATE 5 MG: 5 TABLET ORAL at 08:11

## 2024-11-06 NOTE — PLAN OF CARE
Discussed poc with pt, pt verbalized understanding    Purposeful rounding every 2hours    VS wnl  Cardiac monitoring in use, pt is NSR, tele monitor #[id number]    Fall precautions in place, remains injury free  Pt denies c/o pain    IVFs infusing  Accurate I&Os    Bed locked at lowest position  Call light within reach    Chart check complete  Will cont with POC

## 2024-11-06 NOTE — ASSESSMENT & PLAN NOTE
Open repair 11/05/2024.  Healing well and as expected.  Regular diet.  Discharge home if tolerated.  Will need a prescription for low-dose narcotics.  Follow up surgery in several weeks

## 2024-11-06 NOTE — ASSESSMENT & PLAN NOTE
Open repair with mesh 11/05/2024.  Tolerated clears   Regular diet   Discharge home   Narcotics and per hospital Medicine   Follow up with surgery in 3 weeks

## 2024-11-06 NOTE — NURSING
Patient IV removed. Tele monitor removed and returned to basket at nursing station.  Discharge instructions reviewed with patient and copy given. Med delivered to pts room.  All questions answered.  Patient discharged to home.

## 2024-11-06 NOTE — PLAN OF CARE
[facility] - Med Surg  Discharge Final Note    Primary Care Provider: Amy Milligan NP    Expected Discharge Date: 11/6/2024    Final Discharge Note (most recent)       Final Note - 11/06/24 1323          Final Note    Assessment Type Final Discharge Note     Anticipated Discharge Disposition Home or Self Care     Hospital Resources/Appts/Education Provided Appointments scheduled and added to AVS                              Contact Info       Shar Berrios MD   Specialty: General Surgery    [address] [place] [id number]   Phone: [phone]       Next Steps: Follow up in 3 week(s)    Amy Milligan NP   Specialty: Family Medicine   Relationship: PCP - General    [address] [place] [address]   Phone: [phone]       Next Steps: Schedule an appointment as soon as possible for a visit in 1 week(s)

## 2024-11-06 NOTE — PROGRESS NOTES
[facility] Surg  General Surgery  Progress Note    Subjective:     History of Present Illness:  84-year-old female who noticed a bulge in her left groin found to have incarcerated left hernia.  A CT scan was obtained.  The patient was admitted to the hospital Medicine    The patient like us that has been there for many it was not a    Post-Op Info:  Procedure(s) (LRB):  REPAIR, HERNIA, INGUINAL (Left)  REPAIR, HERNIA, VENTRAL (N/A)   1 Day Post-Op     Interval History:  Did okay with liquids.  Will advance diet.  Can be discharged if a diet is tolerated    Medications:  Continuous Infusions:   lactated ringers   Intravenous Continuous 75 mL/hr at 11/05/24 1748 Rate Verify at 11/05/24 1748     Scheduled Meds:   amLODIPine  5 mg Oral Daily    atorvastatin  20 mg Oral QHS    cefTRIAXone (Rocephin) IV (PEDS and ADULTS)  1 g Intravenous Q24H    chlorhexidine  10 mL Mouth/Throat BID    enoxparin  40 mg Subcutaneous Daily    hydroCHLOROthiazide  12.5 mg Oral Daily     PRN Meds:  Current Facility-Administered Medications:     acetaminophen, 650 mg, Oral, Q8H PRN    HYDROcodone-acetaminophen, 1 tablet, Oral, Q6H PRN    HYDROcodone-acetaminophen, 1 tablet, Oral, Q6H PRN    HYDROmorphone, 0.5 mg, Intravenous, Q4H PRN    ondansetron, 8 mg, Oral, Q8H PRN    ondansetron, 4 mg, Intravenous, Q8H PRN    sodium chloride 0.9%, 10 mL, Intravenous, PRN    zolpidem, 5 mg, Oral, Nightly PRN     Review of patient's allergies indicates:   Allergen Reactions    Vioxx [rofecoxib]      Objective:     Vital Signs (Most Recent):  Temp: 98.6 °F (37 °C) (11/06/24 0742)  Pulse: 81 (11/06/24 0742)  Resp: 16 (11/06/24 0742)  BP: (!) 158/71 (11/06/24 0742)  SpO2: 97 % (11/06/24 0742) Vital Signs (24h Range):  Temp:  [97.7 °F (36.5 °C)-99 °F (37.2 °C)] 98.6 °F (37 °C)  Pulse:  [59-95] 81  Resp:  [12-23] 16  SpO2:  [94 %-100 %] 97 %  BP: (123-180)/(57-78) 158/71     Weight: 44.5 kg (98 lb 1.7 oz)  Body mass index is 21.99 kg/m².    Intake/Output -  Last 3 Shifts         11/04 0700 11/05 0659 11/05 0700 11/06 0659 11/06 0700 11/07 0659    P.O.  360     I.V. (mL/kg) 1050 (25.4) 91.8 (2.1)     IV Piggyback 1200 100     Total Intake(mL/kg) 2250 (54.5) 551.8 (12.4)     Net +2250 +551.8            Urine Occurrence  9 x     Stool Occurrence  0 x              Physical Exam  Constitutional:       Appearance: She is well-developed.      Comments: Slightly frail   HENT:      Head: Normocephalic.   Eyes:      Pupils: Pupils are equal, round, and reactive to light.   Neck:      Thyroid: No thyromegaly.      Vascular: No JVD.      Trachea: No tracheal deviation.   Cardiovascular:      Rate and Rhythm: Normal rate and regular rhythm.      Heart sounds: Normal heart sounds.   Pulmonary:      Breath sounds: Normal breath sounds. No wheezing.   Abdominal:      General: Bowel sounds are normal. There is no distension.      Palpations: Abdomen is soft. Abdomen is not rigid. There is no mass.      Tenderness: There is no abdominal tenderness. There is no guarding or rebound.      Comments: Slightly obese.  Supraumbilical and inguinal incisions are clean   Musculoskeletal:         General: Normal range of motion.      Right lower leg: No edema.      Left lower leg: No edema.   Lymphadenopathy:      Cervical: No cervical adenopathy.   Skin:     General: Skin is warm and dry.      Findings: No erythema or rash.   Neurological:      Mental Status: She is oriented to person, place, and time.   Psychiatric:         Mood and Affect: Mood normal.         Behavior: Behavior normal.         Thought Content: Thought content normal.         Judgment: Judgment normal.          Significant Labs:  I have reviewed all pertinent lab results within the past 24 hours.  CBC:   Recent Labs   Lab 11/06/24  0615   WBC 11.95  11.63   RBC 3.93*  3.84*   HGB 11.5*  11.4*   HCT 34.7*  33.6*     236   MCV 88  88   MCH 29.3  29.7   MCHC 33.1  33.9     BMP:   Recent Labs   Lab 11/04/24  4469  11/05/24  0246 11/06/24  0615   *   < > 106  105   *   < > 138  139   K 2.9*   < > 3.4*  3.4*   CL 99   < > 103  104   CO2 24   < > 25  26   BUN 21   < > 14  14   CREATININE 0.9   < > 0.8  0.7   CALCIUM 9.0   < > 8.8  8.6*   MG 2.1  --   --     < > = values in this interval not displayed.     CMP:   Recent Labs   Lab 11/06/24  0615     105   CALCIUM 8.8  8.6*   ALBUMIN 3.3*   PROT 6.3     139   K 3.4*  3.4*   CO2 25  26     104   BUN 14  14   CREATININE 0.8  0.7   ALKPHOS 78   ALT 14   AST 31   BILITOT 0.5       Significant Diagnostics:  I have reviewed all pertinent imaging results/findings within the past 24 hours.  No new  Assessment/Plan:     * Inguinal hernia  Open repair 11/05/2024.  Healing well and as expected.  Regular diet.  Discharge home if tolerated.  Will need a prescription for low-dose narcotics.  Follow up surgery in several weeks    Ventral hernia without obstruction or gangrene  Open repair with mesh 11/05/2024.  Tolerated clears   Regular diet   Discharge home   Narcotics and per hospital Medicine   Follow up with surgery in 3 weeks    UTI (urinary tract infection)  She does have a positive leuk esterase but there are no significant cells were bacteria on the Medicine.  This is not a contraindication to hernia repair    Hypokalemia  Management per hospital Medicine    Essential hypertension  Management per hospital joey Berrios MD  General Surgery  [facility] Surg

## 2024-11-06 NOTE — HOSPITAL COURSE
Admitted to Hospital Medicine for evaluation after undergoing repair of incarcerated inguinal hernia by General surgery 11/5/24.  Patient continued to remain hemodynamically stable.  Patient was able to tolerate a regular diet with successful bowel movements and without any significant concerns.  UA concerning for nitrites positive with patient/caretaker reporting increased urinary frequency so treated with ceftriaxone x2 days and de-escalated to cephalexin course on discharge.  Pain symptoms remained stable, discussed analgesic options with patient and she preferred non opiate regimen.  Recommended trial of acetaminophen OTC for pain control.     Discharge plan of care was discussed at length with patient/daughter including patient's need for close outpatient follow-up. Outpatient follow-ups were scheduled, or if unable to be scheduled ambulatory referrals were placed. Counseled on PCP follow up within 1 week with repeat lab work to ensure normalization, follow up of pending labs, or any further evaluation as necessitated.  Counseled on general surgery follow up for postoperative evaluation. All questions and concerns were answered. Return precautions provided.  Patient counseled to return to the hospital or seek medical care if baseline status should suddenly change, return of symptoms occurs, or for any new or concerning symptoms that arise.  Patient/daughter were in agreement with plan of care going forward. Patient seen and examined on the day of discharge. Patient deemed stable for discharge.

## 2024-11-06 NOTE — DISCHARGE SUMMARY
[facility] Medicine  Discharge Summary      Patient Name: Lacey Villafana  MRN: [id number]  LOIS: [id number]  Patient Class: OP- Outpatient Recovery  Admission Date: 11/4/2024  Hospital Length of Stay: 0 days  Discharge Date and Time:  11/06/2024 3:17 PM  Attending Physician: Rich Kerns DO   Discharging Provider: Rich Kerns DO  Primary Care Provider: Amy Milligan NP    Primary Care Team: Networked reference to record PCT     HPI:   Lacey Villafana is a 84 year old female  has a past medical history of Hypertension. who went to [facility] ED with c/o left lower abdominal pian that started yesterday evening. She denies N/V. She went to the store and came back, sat outside on her swing and stomach began to hurt. She denies any injury and/or trauma. She decided to take a hot shower to feel better and that's when her and daughter saw the protruding hernia. ED workup showed K+ 2.9. UA nitrite positive, 1+ leukos. CT abdomen/pelvis showed dilated small bowel loops in the mid abdomen measuring up to 3.2 cm consistent with partial small bowel obstruction. Lead point in the bowel containing left inguinal hernia is noted. No evidence of acute appendicitis. Mild moderate amount of stool in the colon. Colonic diverticulosis. Dr. Campos consulted Dr. Dupont (General surgery) who recommended reduction of hernia which was successful in ED. It was recommended to observe overnight for possible complications. Pt admitted to observation for inguinal hernia.     Procedure(s) (LRB):  REPAIR, HERNIA, INGUINAL (Left)  REPAIR, HERNIA, VENTRAL (N/A)      Hospital Course:   Admitted to Hospital Medicine for evaluation after undergoing repair of incarcerated inguinal hernia by General surgery 11/5/24.  Patient continued to remain hemodynamically stable.  Patient was able to tolerate a regular diet with successful bowel movements and without any significant concerns.  UA concerning for nitrites positive  with patient/caretaker reporting increased urinary frequency so treated with ceftriaxone x2 days and de-escalated to cephalexin course on discharge.  Pain symptoms remained stable, discussed analgesic options with patient and she preferred non opiate regimen.  Recommended trial of acetaminophen OTC for pain control.     Discharge plan of care was discussed at length with patient/daughter including patient's need for close outpatient follow-up. Outpatient follow-ups were scheduled, or if unable to be scheduled ambulatory referrals were placed. Counseled on PCP follow up within 1 week with repeat lab work to ensure normalization, follow up of pending labs, or any further evaluation as necessitated.  Counseled on general surgery follow up for postoperative evaluation. All questions and concerns were answered. Return precautions provided.  Patient counseled to return to the hospital or seek medical care if baseline status should suddenly change, return of symptoms occurs, or for any new or concerning symptoms that arise.  Patient/daughter were in agreement with plan of care going forward. Patient seen and examined on the day of discharge. Patient deemed stable for discharge.      Goals of Care Treatment Preferences:  Code Status: Full Code      SDOH Screening:  The patient was screened for utility difficulties, food insecurity, transport difficulties, housing insecurity, and interpersonal safety and there were no concerns identified this admission.     Consults:   Consults (From admission, onward)          Status Ordering Provider     Inpatient consult to General Surgery  Once        Provider:  Basil Medina MD    Completed VIGRILIO ROMERO              Final Active Diagnoses:    Diagnosis Date Noted POA    PRINCIPAL PROBLEM:  Inguinal hernia [K40.90] 08/13/2024 Yes    Hypokalemia [E87.6] 11/05/2024 Yes    UTI (urinary tract infection) [N39.0] 11/05/2024 Yes    Ventral hernia without obstruction or gangrene  [K43.9] 11/05/2024 Yes    Essential hypertension [I10] 12/10/2019 Yes     Chronic      Problems Resolved During this Admission:       Discharged Condition: stable    Disposition: Home or Self Care    Follow Up:   Follow-up Information       Shar Berrios MD Follow up in 3 week(s).    Specialty: General Surgery  Why: We have placed a referral to General surgery.  Please give our scheduling line a call at [phone] to schedule an appointment with them if it has not been setup already.  Contact information:  [address] [facility] [address] [phone]               Amy Milligan NP. Schedule an appointment as soon as possible for a visit in 1 week(s).    Specialty: Family Medicine  Why: Your labs remained stable, but some labs still remained abnormal. As we discussed, it is extremely important for you to followup with a primary care physician within 1 week for repeat lab work to ensure normalization, follow up of pending labs, medication adjustments, routine post-discharge care, and any other evaluations as necessitated.  Contact information:  [address] [address] [address] [phone]                           Patient Instructions:      Notify your health care provider if you experience any of the following:  temperature >100.4     Notify your health care provider if you experience any of the following:  persistent nausea and vomiting or diarrhea     Notify your health care provider if you experience any of the following:  severe uncontrolled pain     Notify your health care provider if you experience any of the following:  redness, tenderness, or signs of infection (pain, swelling, redness, odor or green/yellow discharge around incision site)     Notify your health care provider if you experience any of the following:  difficulty breathing or increased cough     Notify your health care provider if you experience any of the following:  severe persistent headache     Notify  your health care provider if you experience any of the following:  worsening rash     Notify your health care provider if you experience any of the following:  persistent dizziness, light-headedness, or visual disturbances     Notify your health care provider if you experience any of the following:  increased confusion or weakness     Notify your health care provider if you experience any of the following:   Order Comments: POST SURGICAL COMPLICATION: Please return to the ED immediately if you experience any of the following: increased redness, swelling, or warmth at the incision site, worsening or sudden onset of pain, fever, chills, nausea or vomiting, a bulge or swelling near the repair site, or any difficulty urinating. These could be signs of infection, hematoma, or other complications that require prompt evaluation and treatment.    UTI: Please return to the ED immediately if you experience any of the following: fever, chills, lower abdominal pain, flank pain, nausea or vomiting, blood in your urine,       Significant Diagnostic Studies:     Significant Labs: All pertinent labs within the past 24 hours have been reviewed.  LABS:  Recent Labs   Lab 11/04/24 1849 11/05/24 0246 11/06/24  0615   * 137 138  139   K 2.9* 3.5 3.4*  3.4*   CL 99 102 103  104   CO2 24 23 25  26   BUN 21 12 14  14   CREATININE 0.9 0.7 0.8  0.7   * 108 106  105   ANIONGAP 12 12 10  9     Recent Labs   Lab 11/04/24 1849   MG 2.1     Recent Labs   Lab 11/04/24 1849 11/06/24  0615   AST 19 31   ALT 13 14   ALKPHOS 73 78   BILITOT 0.4 0.5   ALBUMIN 3.9 3.3*    Recent Labs   Lab 11/04/24 1849 11/06/24  0615   WBC 10.77 11.95  11.63   HGB 12.0 11.5*  11.4*   HCT 35.2* 34.7*  33.6*    241  236   GRAN 82.7*  8.9* 84.1*  9.8*          Significant Imaging:   CT Abdomen Pelvis With IV Contrast Routine Oral Contrast   Final Result      Partial small bowel obstruction with a lead point corresponding to a left  inguinal bowel containing hernia.      Mild moderate amount of stool in the colon.      Colonic diverticulosis      Status post cholecystectomy      Fatty infiltration liver mild intrahepatic biliary dilatation      Status post hysterectomy.      All CT scans at this facility use dose modulation, iterative reconstruction, and/or weight based dosing when appropriate to reduce radiation dose to as low as reasonably achievable.         Electronically signed by: Donny Palmer   Date:    11/04/2024   Time:    20:31          Pending Diagnostic Studies:       None           Medications:  Reconciled Home Medications:      Medication List        START taking these medications      cephALEXin 500 MG capsule  Commonly known as: KEFLEX  Take 1 capsule (500 mg total) by mouth every 12 (twelve) hours. for 3 days            CONTINUE taking these medications      amLODIPine 5 MG tablet  Commonly known as: NORVASC  Take 1 tablet (5 mg total) by mouth once daily.     aspirin 81 MG EC tablet  Commonly known as: ECOTRIN  81 mg.     COD LIVER OIL ORAL  Take 1 tablet by mouth once daily.     glucosamine-chondroitin 500-400 mg tablet  Take 1 tablet by mouth 3 (three) times daily.     hydroCHLOROthiazide 12.5 MG Tab  Commonly known as: HYDRODIURIL  Take 1 tablet (12.5 mg total) by mouth once daily.     OCUVITE ORAL  Take 1 tablet by mouth once daily.     simvastatin 40 MG tablet  Commonly known as: ZOCOR  Take 1 tablet (40 mg total) by mouth every evening.     vitamin D 1000 units Tab  Commonly known as: VITAMIN D3  Take 1,000 Units by mouth once daily.     zolpidem 5 MG Tab  Commonly known as: AMBIEN  TAKE 1 TABLET BY MOUTH NIGHTLY AS NEEDED              Indwelling Lines/Drains at time of discharge:   Lines/Drains/Airways       None                   Time spent on the discharge of patient: 45 minutes         Rich Kerns DO  Department of Hospital Medicine  [facility]    Voice recognition software was used in the creation of  this note/communication and any sound-alike/typographical errors which may have occurred despite initial review prior to signing should be taken in context when interpreting.  If such errors prevent a clear understanding of the note/communication, please contact the provider/office for clarification.

## 2024-11-06 NOTE — SUBJECTIVE & OBJECTIVE
Interval History:  Did okay with liquids.  Will advance diet.  Can be discharged if a diet is tolerated    Medications:  Continuous Infusions:   lactated ringers   Intravenous Continuous 75 mL/hr at 11/05/24 1748 Rate Verify at 11/05/24 1748     Scheduled Meds:   amLODIPine  5 mg Oral Daily    atorvastatin  20 mg Oral QHS    cefTRIAXone (Rocephin) IV (PEDS and ADULTS)  1 g Intravenous Q24H    chlorhexidine  10 mL Mouth/Throat BID    enoxparin  40 mg Subcutaneous Daily    hydroCHLOROthiazide  12.5 mg Oral Daily     PRN Meds:  Current Facility-Administered Medications:     acetaminophen, 650 mg, Oral, Q8H PRN    HYDROcodone-acetaminophen, 1 tablet, Oral, Q6H PRN    HYDROcodone-acetaminophen, 1 tablet, Oral, Q6H PRN    HYDROmorphone, 0.5 mg, Intravenous, Q4H PRN    ondansetron, 8 mg, Oral, Q8H PRN    ondansetron, 4 mg, Intravenous, Q8H PRN    sodium chloride 0.9%, 10 mL, Intravenous, PRN    zolpidem, 5 mg, Oral, Nightly PRN     Review of patient's allergies indicates:   Allergen Reactions    Vioxx [rofecoxib]      Objective:     Vital Signs (Most Recent):  Temp: 98.6 °F (37 °C) (11/06/24 0742)  Pulse: 81 (11/06/24 0742)  Resp: 16 (11/06/24 0742)  BP: (!) 158/71 (11/06/24 0742)  SpO2: 97 % (11/06/24 0742) Vital Signs (24h Range):  Temp:  [97.7 °F (36.5 °C)-99 °F (37.2 °C)] 98.6 °F (37 °C)  Pulse:  [59-95] 81  Resp:  [12-23] 16  SpO2:  [94 %-100 %] 97 %  BP: (123-180)/(57-78) 158/71     Weight: 44.5 kg (98 lb 1.7 oz)  Body mass index is 21.99 kg/m².    Intake/Output - Last 3 Shifts         11/04 0700 11/05 0659 11/05 0700 11/06 0659 11/06 0700 11/07 0659    P.O.  360     I.V. (mL/kg) 1050 (25.4) 91.8 (2.1)     IV Piggyback 1200 100     Total Intake(mL/kg) 2250 (54.5) 551.8 (12.4)     Net +2250 +551.8            Urine Occurrence  9 x     Stool Occurrence  0 x              Physical Exam  Constitutional:       Appearance: She is well-developed.      Comments: Slightly frail   HENT:      Head: Normocephalic.   Eyes:       Pupils: Pupils are equal, round, and reactive to light.   Neck:      Thyroid: No thyromegaly.      Vascular: No JVD.      Trachea: No tracheal deviation.   Cardiovascular:      Rate and Rhythm: Normal rate and regular rhythm.      Heart sounds: Normal heart sounds.   Pulmonary:      Breath sounds: Normal breath sounds. No wheezing.   Abdominal:      General: Bowel sounds are normal. There is no distension.      Palpations: Abdomen is soft. Abdomen is not rigid. There is no mass.      Tenderness: There is no abdominal tenderness. There is no guarding or rebound.      Comments: Slightly obese.  Supraumbilical and inguinal incisions are clean   Musculoskeletal:         General: Normal range of motion.      Right lower leg: No edema.      Left lower leg: No edema.   Lymphadenopathy:      Cervical: No cervical adenopathy.   Skin:     General: Skin is warm and dry.      Findings: No erythema or rash.   Neurological:      Mental Status: She is oriented to person, place, and time.   Psychiatric:         Mood and Affect: Mood normal.         Behavior: Behavior normal.         Thought Content: Thought content normal.         Judgment: Judgment normal.          Significant Labs:  I have reviewed all pertinent lab results within the past 24 hours.  CBC:   Recent Labs   Lab 11/06/24  0615   WBC 11.95  11.63   RBC 3.93*  3.84*   HGB 11.5*  11.4*   HCT 34.7*  33.6*     236   MCV 88  88   MCH 29.3  29.7   MCHC 33.1  33.9     BMP:   Recent Labs   Lab 11/04/24  1849 11/05/24  0246 11/06/24  0615   *   < > 106  105   *   < > 138  139   K 2.9*   < > 3.4*  3.4*   CL 99   < > 103  104   CO2 24   < > 25  26   BUN 21   < > 14  14   CREATININE 0.9   < > 0.8  0.7   CALCIUM 9.0   < > 8.8  8.6*   MG 2.1  --   --     < > = values in this interval not displayed.     CMP:   Recent Labs   Lab 11/06/24  0615     105   CALCIUM 8.8  8.6*   ALBUMIN 3.3*   PROT 6.3     139   K 3.4*  3.4*   CO2 25   26     104   BUN 14  14   CREATININE 0.8  0.7   ALKPHOS 78   ALT 14   AST 31   BILITOT 0.5       Significant Diagnostics:  I have reviewed all pertinent imaging results/findings within the past 24 hours.  No new

## 2024-11-18 ENCOUNTER — OFFICE VISIT (OUTPATIENT)
Dept: INTERNAL MEDICINE | Facility: CLINIC | Age: 84
End: 2024-11-18
Payer: MEDICARE

## 2024-11-18 ENCOUNTER — LAB VISIT (OUTPATIENT)
Dept: LAB | Facility: HOSPITAL | Age: 84
End: 2024-11-18
Attending: NURSE PRACTITIONER
Payer: MEDICARE

## 2024-11-18 VITALS
DIASTOLIC BLOOD PRESSURE: 60 MMHG | WEIGHT: 97.88 LBS | HEART RATE: 89 BPM | SYSTOLIC BLOOD PRESSURE: 138 MMHG | OXYGEN SATURATION: 97 % | HEIGHT: 56 IN | BODY MASS INDEX: 22.02 KG/M2 | TEMPERATURE: 96 F

## 2024-11-18 DIAGNOSIS — Z09 HOSPITAL DISCHARGE FOLLOW-UP: Primary | ICD-10-CM

## 2024-11-18 DIAGNOSIS — Z98.890 S/P HERNIA REPAIR: ICD-10-CM

## 2024-11-18 DIAGNOSIS — E87.6 HYPOKALEMIA: ICD-10-CM

## 2024-11-18 DIAGNOSIS — Z87.19 S/P HERNIA REPAIR: ICD-10-CM

## 2024-11-18 DIAGNOSIS — K43.9 VENTRAL HERNIA WITHOUT OBSTRUCTION OR GANGRENE: ICD-10-CM

## 2024-11-18 DIAGNOSIS — Z09 HOSPITAL DISCHARGE FOLLOW-UP: ICD-10-CM

## 2024-11-18 DIAGNOSIS — I10 ESSENTIAL HYPERTENSION: ICD-10-CM

## 2024-11-18 PROBLEM — N39.0 UTI (URINARY TRACT INFECTION): Status: RESOLVED | Noted: 2024-11-05 | Resolved: 2024-11-18

## 2024-11-18 LAB
ALBUMIN SERPL BCP-MCNC: 4.1 G/DL (ref 3.5–5.2)
ALP SERPL-CCNC: 88 U/L (ref 40–150)
ALT SERPL W/O P-5'-P-CCNC: 13 U/L (ref 10–44)
ANION GAP SERPL CALC-SCNC: 13 MMOL/L (ref 8–16)
AST SERPL-CCNC: 20 U/L (ref 10–40)
BASOPHILS # BLD AUTO: 0.08 K/UL (ref 0–0.2)
BASOPHILS NFR BLD: 0.9 % (ref 0–1.9)
BILIRUB SERPL-MCNC: 0.5 MG/DL (ref 0.1–1)
BUN SERPL-MCNC: 18 MG/DL (ref 8–23)
CALCIUM SERPL-MCNC: 10.1 MG/DL (ref 8.7–10.5)
CHLORIDE SERPL-SCNC: 96 MMOL/L (ref 95–110)
CO2 SERPL-SCNC: 27 MMOL/L (ref 23–29)
CREAT SERPL-MCNC: 0.8 MG/DL (ref 0.5–1.4)
DIFFERENTIAL METHOD BLD: ABNORMAL
EOSINOPHIL # BLD AUTO: 0.1 K/UL (ref 0–0.5)
EOSINOPHIL NFR BLD: 1.5 % (ref 0–8)
ERYTHROCYTE [DISTWIDTH] IN BLOOD BY AUTOMATED COUNT: 13.3 % (ref 11.5–14.5)
EST. GFR  (NO RACE VARIABLE): >60 ML/MIN/1.73 M^2
GLUCOSE SERPL-MCNC: 108 MG/DL (ref 70–110)
HCT VFR BLD AUTO: 39.1 % (ref 37–48.5)
HGB BLD-MCNC: 13.2 G/DL (ref 12–16)
IMM GRANULOCYTES # BLD AUTO: 0.04 K/UL (ref 0–0.04)
IMM GRANULOCYTES NFR BLD AUTO: 0.4 % (ref 0–0.5)
LYMPHOCYTES # BLD AUTO: 1.6 K/UL (ref 1–4.8)
LYMPHOCYTES NFR BLD: 17.1 % (ref 18–48)
MCH RBC QN AUTO: 29.4 PG (ref 27–31)
MCHC RBC AUTO-ENTMCNC: 33.8 G/DL (ref 32–36)
MCV RBC AUTO: 87 FL (ref 82–98)
MONOCYTES # BLD AUTO: 0.7 K/UL (ref 0.3–1)
MONOCYTES NFR BLD: 7.3 % (ref 4–15)
NEUTROPHILS # BLD AUTO: 6.8 K/UL (ref 1.8–7.7)
NEUTROPHILS NFR BLD: 72.8 % (ref 38–73)
NRBC BLD-RTO: 0 /100 WBC
PLATELET # BLD AUTO: 348 K/UL (ref 150–450)
PMV BLD AUTO: 9.3 FL (ref 9.2–12.9)
POTASSIUM SERPL-SCNC: 3.5 MMOL/L (ref 3.5–5.1)
PROT SERPL-MCNC: 8.2 G/DL (ref 6–8.4)
RBC # BLD AUTO: 4.49 M/UL (ref 4–5.4)
SODIUM SERPL-SCNC: 136 MMOL/L (ref 136–145)
WBC # BLD AUTO: 9.37 K/UL (ref 3.9–12.7)

## 2024-11-18 PROCEDURE — 36415 COLL VENOUS BLD VENIPUNCTURE: CPT | Mod: HCWC,PO | Performed by: NURSE PRACTITIONER

## 2024-11-18 PROCEDURE — 85025 COMPLETE CBC W/AUTO DIFF WBC: CPT | Mod: HCWC,PO | Performed by: NURSE PRACTITIONER

## 2024-11-18 PROCEDURE — 99999 PR PBB SHADOW E&M-EST. PATIENT-LVL III: CPT | Mod: PBBFAC,HCWC,, | Performed by: NURSE PRACTITIONER

## 2024-11-18 PROCEDURE — 80053 COMPREHEN METABOLIC PANEL: CPT | Mod: HCWC,PO | Performed by: NURSE PRACTITIONER

## 2024-11-18 NOTE — PROGRESS NOTES
Subjective:       Patient ID: Lacey Villafana is a 84 y.o. female.    Chief Complaint: Transitional Care      History of Present Illness    CHIEF COMPLAINT:  - Ms. Villafana presents for a post-operative follow-up visit approximately one week after undergoing surgery.    HPI:  Ms. Villafana presents to visit for hospital discharge follow up. Admitted from 11/4-11/6 for inguinal hernia with obstriction. Underwent surgery on the 11/5. Inguinal and ventral hernia repairs completed. No complications noted during surgery.The surgery involved incisions in the left groin area and below the belly button.  Ms. Villafana reports being cautious due to soreness from the recent surgery. Bruising is present, particularly in an area where surgical intervention was required during the procedure. While in the hospital, the patient had low potassium levels and was slightly anemic, but stable before discharge. Ms. Villafana has been able to shower independently after receiving assistance for the first few days post-surgery.    Ms. Villafana denies any problems with bowel movements, urination, fever, chills, vomiting, blood in stool, or blood in urine. Ms. Villafana also denies any significant pain or tenderness at the incision sites.  Hospital encounter reviewed. HPI and discharge summary copied below.          HPI and discharge summary copied from hospital encounter:   Lacey Villafana is a 84 year old female  has a past medical history of Hypertension. who went to [facility] ED with c/o left lower abdominal pian that started yesterday evening. She denies N/V. She went to the store and came back, sat outside on her swing and stomach began to hurt. She denies any injury and/or trauma. She decided to take a hot shower to feel better and that's when her and daughter saw the protruding hernia. ED workup showed K+ 2.9. UA nitrite positive, 1+ leukos. CT abdomen/pelvis showed dilated small bowel loops in the mid abdomen measuring up  to 3.2 cm consistent with partial small bowel obstruction. Lead point in the bowel containing left inguinal hernia is noted. No evidence of acute appendicitis. Mild moderate amount of stool in the colon. Colonic diverticulosis. Dr. Campos consulted Dr. Dupont (General surgery) who recommended reduction of hernia which was successful in ED. It was recommended to observe overnight for possible complications. Pt admitted to observation for inguinal hernia.      Procedure(s) (LRB):  REPAIR, HERNIA, INGUINAL (Left)  REPAIR, HERNIA, VENTRAL (N/A)       Hospital Course:   Admitted to Hospital Medicine for evaluation after undergoing repair of incarcerated inguinal hernia by General surgery 11/5/24.  Patient continued to remain hemodynamically stable.  Patient was able to tolerate a regular diet with successful bowel movements and without any significant concerns.  UA concerning for nitrites positive with patient/caretaker reporting increased urinary frequency so treated with ceftriaxone x2 days and de-escalated to cephalexin course on discharge.  Pain symptoms remained stable, discussed analgesic options with patient and she preferred non opiate regimen.  Recommended trial of acetaminophen OTC for pain control.      Discharge plan of care was discussed at length with patient/daughter including patient's need for close outpatient follow-up. Outpatient follow-ups were scheduled, or if unable to be scheduled ambulatory referrals were placed. Counseled on PCP follow up within 1 week with repeat lab work to ensure normalization, follow up of pending labs, or any further evaluation as necessitated.  Counseled on general surgery follow up for postoperative evaluation. All questions and concerns were answered. Return precautions provided.  Patient counseled to return to the hospital or seek medical care if baseline status should suddenly change, return of symptoms occurs, or for any new or concerning symptoms that arise.   Patient/daughter were in agreement with plan of care going forward. Patient seen and examined on the day of discharge. Patient deemed stable for discharge.      Transitional Care Note    Family and/or Caretaker present at visit?  Yes.  Diagnostic tests reviewed/disposition: I have reviewed all completed as well as pending diagnostic tests at the time of discharge.  Disease/illness education: Ventral hernia, inguinal hernia, hypokalemia  Home health/community services discussion/referrals: Patient does not have home health established from hospital visit.  They do not need home health.  If needed, we will set up home health for the patient.   Establishment or re-establishment of referral orders for community resources: No other necessary community resources.   Discussion with other health care providers: No discussion with other health care providers necessary.           Patient Active Problem List   Diagnosis    Other insomnia    Essential hypertension    Prediabetes    Inguinal hernia    Hypokalemia    Ventral hernia without obstruction or gangrene         Past Surgical History:   Procedure Laterality Date    CHOLECYSTECTOMY      HEMORRHOID SURGERY      HYSTERECTOMY      OOPHORECTOMY      REPAIR, HERNIA, INGUINAL Left 11/5/2024    Procedure: REPAIR, HERNIA, INGUINAL;  Surgeon: Shar Berrios MD;  Location: [facility] OR;  Service: General;  Laterality: Left;    REPAIR, HERNIA, VENTRAL N/A 11/5/2024    Procedure: REPAIR, HERNIA, VENTRAL;  Surgeon: Shar Berrios MD;  Location: [facility] OR;  Service: General;  Laterality: N/A;         Current Outpatient Medications:     amLODIPine (NORVASC) 5 MG tablet, Take 1 tablet (5 mg total) by mouth once daily., Disp: 90 tablet, Rfl: 3    aspirin (ECOTRIN) 81 MG EC tablet, 81 mg., Disp: , Rfl:     beta-carotene,A,-vits C,E/mins (OCUVITE ORAL), Take 1 tablet by mouth once daily., Disp: , Rfl:     COD LIVER OIL ORAL, Take 1 tablet by mouth once daily., Disp: , Rfl:      "glucosamine-chondroitin 500-400 mg tablet, Take 1 tablet by mouth 3 (three) times daily., Disp: , Rfl:     hydroCHLOROthiazide (HYDRODIURIL) 12.5 MG Tab, Take 1 tablet (12.5 mg total) by mouth once daily., Disp: 90 tablet, Rfl: 3    simvastatin (ZOCOR) 40 MG tablet, Take 1 tablet (40 mg total) by mouth every evening., Disp: 90 tablet, Rfl: 3    vitamin D (VITAMIN D3) 1000 units Tab, Take 1,000 Units by mouth once daily., Disp: , Rfl:     zolpidem (AMBIEN) 5 MG Tab, TAKE 1 TABLET BY MOUTH NIGHTLY AS NEEDED, Disp: 90 tablet, Rfl: 1    Review of Systems   Constitutional:  Negative for appetite change, chills, fatigue and fever.   Respiratory:  Negative for cough and shortness of breath.    Cardiovascular:  Negative for chest pain and palpitations.   Gastrointestinal:  Negative for abdominal pain, constipation, diarrhea, nausea and vomiting.   Neurological:  Negative for dizziness, light-headedness and headaches.       Objective:   /60 (BP Location: Left arm, Patient Position: Sitting)   Pulse 89   Temp 96.4 °F (35.8 °C) (Tympanic)   Ht 4' 8" (1.422 m)   Wt 44.4 kg (97 lb 14.2 oz)   SpO2 97%   BMI 21.95 kg/m²      Physical Exam  Constitutional:       General: She is not in acute distress.     Appearance: Normal appearance. She is not ill-appearing.   HENT:      Head: Normocephalic.   Cardiovascular:      Rate and Rhythm: Normal rate and regular rhythm.      Pulses: Normal pulses.      Heart sounds: Normal heart sounds. No murmur heard.     No friction rub. No gallop.   Pulmonary:      Effort: Pulmonary effort is normal. No respiratory distress.      Breath sounds: Normal breath sounds. No wheezing.   Abdominal:      Palpations: Abdomen is soft.      Tenderness: There is abdominal tenderness (mild around incisions). There is no guarding.   Skin:     General: Skin is warm and dry.      Coloration: Skin is not pale.      Findings: Bruising present. No erythema.      Comments: Vertical incision above " "umbilicus  Incision under L panis. No erythema or drainage noted. Mild bruising near umbilical incision.    Neurological:      Mental Status: She is alert and oriented to person, place, and time.   Psychiatric:         Mood and Affect: Mood normal.         Behavior: Behavior normal.         Assessment & Plan     Problem List Items Addressed This Visit          Cardiac/Vascular    Essential hypertension (Chronic)    Current Assessment & Plan     Blood pressure stable. Continue current medications.            Renal/    Hypokalemia    Current Assessment & Plan     Repeating labs today          Relevant Orders    COMPREHENSIVE METABOLIC PANEL       GI    Ventral hernia without obstruction or gangrene    Current Assessment & Plan     S/p repair. Has appropriate follow up with general surgery.          Other Visit Diagnoses       Hospital discharge follow-up    -  Primary    Relevant Orders    CBC Auto Differential (Completed)    COMPREHENSIVE METABOLIC PANEL    S/P hernia repair        Relevant Orders    CBC Auto Differential (Completed)    COMPREHENSIVE METABOLIC PANEL            Assessment & Plan    MEDICAL DECISION MAKING:  - Reviewed patient's recent surgery and post-operative course  - Assessed incision sites, noting good healing with minimal bruising and no signs of infection  - Evaluated for post-operative complications, including bowel and urinary function  - Noted history of low potassium during hospitalization  - Considered mild post-operative anemia, which was stable prior to discharge    ORDERS:  - Potassium level test ordered  - CBC ordered to check anemia levels    FOLLOW UP:  - Follow up with Dr. Gustavo Martines in 3 weeks  - Contact office to schedule appointment if not already confirmed          Follow up if symptoms worsen or fail to improve.          Portions of this note may have been created with voice recognition software. Occasional "wrong-word" or "sound-a-like" substitutions may have occurred due to " the inherent limitations of voice recognition software. Please, read the note carefully and recognize, using context, where substitutions have occurred.       This note was generated with the assistance of ambient listening technology. Verbal consent was obtained by the patient and accompanying visitor(s) for the recording of patient appointment to facilitate this note. I attest to having reviewed and edited the generated note for accuracy, though some syntax or spelling errors may persist. Please contact the author of this note for any clarification.

## 2024-11-23 DIAGNOSIS — I10 ESSENTIAL HYPERTENSION: Chronic | ICD-10-CM

## 2024-11-25 RX ORDER — HYDROCHLOROTHIAZIDE 12.5 MG/1
12.5 TABLET ORAL
Qty: 90 TABLET | Refills: 3 | Status: SHIPPED | OUTPATIENT
Start: 2024-11-25

## 2024-11-25 RX ORDER — SIMVASTATIN 40 MG/1
40 TABLET, FILM COATED ORAL NIGHTLY
Qty: 90 TABLET | Refills: 3 | Status: SHIPPED | OUTPATIENT
Start: 2024-11-25

## 2024-11-25 RX ORDER — AMLODIPINE BESYLATE 5 MG/1
5 TABLET ORAL
Qty: 90 TABLET | Refills: 3 | Status: SHIPPED | OUTPATIENT
Start: 2024-11-25

## 2024-12-16 ENCOUNTER — TELEPHONE (OUTPATIENT)
Dept: INTERNAL MEDICINE | Facility: CLINIC | Age: 84
End: 2024-12-16
Payer: MEDICARE

## 2024-12-16 NOTE — TELEPHONE ENCOUNTER
Returned call informed need to contact her surgeon , in regards to incision raw. Understanding voice

## 2024-12-26 ENCOUNTER — OFFICE VISIT (OUTPATIENT)
Dept: SURGERY | Facility: CLINIC | Age: 84
End: 2024-12-26
Payer: MEDICARE

## 2024-12-26 VITALS
HEART RATE: 90 BPM | WEIGHT: 96.81 LBS | SYSTOLIC BLOOD PRESSURE: 167 MMHG | DIASTOLIC BLOOD PRESSURE: 83 MMHG | BODY MASS INDEX: 21.7 KG/M2

## 2024-12-26 DIAGNOSIS — K43.9 VENTRAL HERNIA WITHOUT OBSTRUCTION OR GANGRENE: ICD-10-CM

## 2024-12-26 DIAGNOSIS — K40.90 NON-RECURRENT UNILATERAL INGUINAL HERNIA WITHOUT OBSTRUCTION OR GANGRENE: Primary | ICD-10-CM

## 2024-12-26 PROBLEM — E87.6 HYPOKALEMIA: Status: RESOLVED | Noted: 2024-11-05 | Resolved: 2024-12-26

## 2024-12-26 PROCEDURE — 99999 PR PBB SHADOW E&M-EST. PATIENT-LVL III: CPT | Mod: PBBFAC,HCWC,, | Performed by: SURGERY

## 2024-12-26 NOTE — PROGRESS NOTES
Subjective:       Patient ID: Lacey Villafana is a 84 y.o. female.    Chief Complaint: Post-op Evaluation    84-year-old female status post open ventral and left inguinal hernia repair with mesh.  Patient states she is doing well.  She has some soreness at the incisions but otherwise she is recovering without incident  Review of Systems   Gastrointestinal:         Incisional soreness     Objective:      Physical Exam  Vitals reviewed.   Constitutional:       Comments: Somewhat frail   Abdominal:      Comments: Ventral abdominal and left inguinal incisions are healing well.  There is no erythema.  There is no evidence of a recurrent hernia   Neurological:      Mental Status: She is alert.       Assessment:    Recovering as expected after open repair of a left inguinal and ventral hernia  Soreness in the area of the hernia repair should resolve.    Plan:       Activity as tolerated.    Follow up with surgery as

## 2024-12-26 NOTE — PATIENT INSTRUCTIONS
There are no limits on your activity.      The incisions will continue to heal over the next several months and the scars was slowly fade.      The soreness will go away.      Please let us know if we can be of any assistance going forward      Our office phone numbers are  [phone] and

## 2025-03-13 DIAGNOSIS — G47.09 OTHER INSOMNIA: ICD-10-CM

## 2025-03-14 RX ORDER — ZOLPIDEM TARTRATE 5 MG/1
5 TABLET ORAL NIGHTLY PRN
Qty: 90 TABLET | Refills: 0 | Status: SHIPPED | OUTPATIENT
Start: 2025-03-14

## 2025-05-23 ENCOUNTER — OFFICE VISIT (OUTPATIENT)
Dept: INTERNAL MEDICINE | Facility: CLINIC | Age: 85
End: 2025-05-23
Payer: MEDICARE

## 2025-05-23 ENCOUNTER — LAB VISIT (OUTPATIENT)
Dept: LAB | Facility: HOSPITAL | Age: 85
End: 2025-05-23
Attending: NURSE PRACTITIONER
Payer: MEDICARE

## 2025-05-23 VITALS
OXYGEN SATURATION: 97 % | TEMPERATURE: 98 F | HEART RATE: 93 BPM | WEIGHT: 98.56 LBS | HEIGHT: 55 IN | BODY MASS INDEX: 22.81 KG/M2 | DIASTOLIC BLOOD PRESSURE: 60 MMHG | SYSTOLIC BLOOD PRESSURE: 132 MMHG

## 2025-05-23 DIAGNOSIS — B35.9 TINEA: ICD-10-CM

## 2025-05-23 DIAGNOSIS — I10 ESSENTIAL HYPERTENSION: Primary | Chronic | ICD-10-CM

## 2025-05-23 DIAGNOSIS — G47.09 OTHER INSOMNIA: ICD-10-CM

## 2025-05-23 DIAGNOSIS — R73.03 PREDIABETES: ICD-10-CM

## 2025-05-23 DIAGNOSIS — I10 ESSENTIAL HYPERTENSION: ICD-10-CM

## 2025-05-23 LAB
ABSOLUTE EOSINOPHIL (OHS): 0.26 K/UL
ABSOLUTE MONOCYTE (OHS): 0.84 K/UL (ref 0.3–1)
ABSOLUTE NEUTROPHIL COUNT (OHS): 5.05 K/UL (ref 1.8–7.7)
ANION GAP (OHS): 11 MMOL/L (ref 8–16)
BASOPHILS # BLD AUTO: 0.06 K/UL
BASOPHILS NFR BLD AUTO: 0.8 %
BUN SERPL-MCNC: 25 MG/DL (ref 8–23)
CALCIUM SERPL-MCNC: 9.3 MG/DL (ref 8.7–10.5)
CHLORIDE SERPL-SCNC: 103 MMOL/L (ref 95–110)
CO2 SERPL-SCNC: 24 MMOL/L (ref 23–29)
CREAT SERPL-MCNC: 0.8 MG/DL (ref 0.5–1.4)
ERYTHROCYTE [DISTWIDTH] IN BLOOD BY AUTOMATED COUNT: 13.3 % (ref 11.5–14.5)
GFR SERPLBLD CREATININE-BSD FMLA CKD-EPI: >60 ML/MIN/1.73/M2
GLUCOSE SERPL-MCNC: 110 MG/DL (ref 70–110)
HCT VFR BLD AUTO: 37.7 % (ref 37–48.5)
HGB BLD-MCNC: 12.8 GM/DL (ref 12–16)
IMM GRANULOCYTES # BLD AUTO: 0.02 K/UL (ref 0–0.04)
IMM GRANULOCYTES NFR BLD AUTO: 0.3 % (ref 0–0.5)
LYMPHOCYTES # BLD AUTO: 1.75 K/UL (ref 1–4.8)
MCH RBC QN AUTO: 29.8 PG (ref 27–31)
MCHC RBC AUTO-ENTMCNC: 34 G/DL (ref 32–36)
MCV RBC AUTO: 88 FL (ref 82–98)
NUCLEATED RBC (/100WBC) (OHS): 0 /100 WBC
PLATELET # BLD AUTO: 219 K/UL (ref 150–450)
PMV BLD AUTO: 9.4 FL (ref 9.2–12.9)
POTASSIUM SERPL-SCNC: 3.7 MMOL/L (ref 3.5–5.1)
RBC # BLD AUTO: 4.3 M/UL (ref 4–5.4)
RELATIVE EOSINOPHIL (OHS): 3.3 %
RELATIVE LYMPHOCYTE (OHS): 21.9 % (ref 18–48)
RELATIVE MONOCYTE (OHS): 10.5 % (ref 4–15)
RELATIVE NEUTROPHIL (OHS): 63.2 % (ref 38–73)
SODIUM SERPL-SCNC: 138 MMOL/L (ref 136–145)
WBC # BLD AUTO: 7.98 K/UL (ref 3.9–12.7)

## 2025-05-23 PROCEDURE — 85025 COMPLETE CBC W/AUTO DIFF WBC: CPT | Mod: PO

## 2025-05-23 PROCEDURE — 83036 HEMOGLOBIN GLYCOSYLATED A1C: CPT

## 2025-05-23 PROCEDURE — 36415 COLL VENOUS BLD VENIPUNCTURE: CPT | Mod: PO

## 2025-05-23 PROCEDURE — 80048 BASIC METABOLIC PNL TOTAL CA: CPT | Mod: PO

## 2025-05-23 PROCEDURE — 99999 PR PBB SHADOW E&M-EST. PATIENT-LVL IV: CPT | Mod: PBBFAC,,, | Performed by: NURSE PRACTITIONER

## 2025-05-23 RX ORDER — ZOLPIDEM TARTRATE 5 MG/1
5 TABLET ORAL NIGHTLY PRN
Qty: 90 TABLET | Refills: 1 | Status: SHIPPED | OUTPATIENT
Start: 2025-05-23

## 2025-05-23 RX ORDER — CLOTRIMAZOLE AND BETAMETHASONE DIPROPIONATE 10; .64 MG/G; MG/G
CREAM TOPICAL 2 TIMES DAILY
Qty: 45 G | Refills: 0 | Status: SHIPPED | OUTPATIENT
Start: 2025-05-23

## 2025-05-23 NOTE — PROGRESS NOTES
Subjective:       Patient ID: Lacey Villafana is a 84 y.o. female.    Chief Complaint: Rash (Red , leg swelling)      History of Present Illness    CHIEF COMPLAINT:  - Ms. Villafana presents with a rash on her legs.    HPI:  Ms. Villafana reports a rash on her legs that started early in the week, possibly on Monday. The rash appears as circular red areas, with one specific circular area noted. She initially thought it might be related to contact with her cat while sitting outside. The rash does not itch. She has applied powder to the affected areas after showering. There is a small bruised area on the side of her leg. She denies scratching the rash.    Her daughter mentions that she had cared for her granddaughter about 3 weeks ago when the child had impetigo. The granddaughter's condition worsened, requiring antibiotics and nearly resulting in hospital admission. Her current rash does not appear to be similar to the granddaughter's condition.    She denies fever, chills, chest pain, shortness of breath, increased urination, increased thirst, dizziness, lightheadedness, headaches, nausea, vomiting, diarrhea, and joint aches more than usual.    Needs refill on ambien soon.       Problem List[1]      Past Surgical History:   Procedure Laterality Date    CHOLECYSTECTOMY      HEMORRHOID SURGERY      HYSTERECTOMY      OOPHORECTOMY      REPAIR, HERNIA, INGUINAL Left 11/5/2024    Procedure: REPAIR, HERNIA, INGUINAL;  Surgeon: Shar Berrios MD;  Location: [facility] OR;  Service: General;  Laterality: Left;    REPAIR, HERNIA, VENTRAL N/A 11/5/2024    Procedure: REPAIR, HERNIA, VENTRAL;  Surgeon: Shar Berrios MD;  Location: [facility] OR;  Service: General;  Laterality: N/A;       Current Medications[2]    Review of Systems   Constitutional:  Negative for appetite change, chills, fatigue and fever.   Respiratory:  Negative for cough and shortness of breath.    Cardiovascular:  Negative for chest pain and palpitations.  "  Gastrointestinal:  Negative for abdominal pain, diarrhea, nausea and vomiting.   Endocrine: Negative for polydipsia, polyphagia and polyuria.   Genitourinary:  Negative for dysuria, frequency and urgency.   Musculoskeletal:  Positive for arthralgias.   Skin:  Positive for rash.   Neurological:  Negative for dizziness, light-headedness and headaches.   Psychiatric/Behavioral:  Negative for dysphoric mood. The patient is not nervous/anxious.        Objective:   /60 (BP Location: Left arm, Patient Position: Sitting)   Pulse 93   Temp 98.2 °F (36.8 °C) (Tympanic)   Ht 4' 6" (1.372 m)   Wt 44.7 kg (98 lb 8.7 oz)   SpO2 97%   BMI 23.76 kg/m²      Physical Exam  Constitutional:       General: She is not in acute distress.     Appearance: Normal appearance. She is not ill-appearing.   Cardiovascular:      Rate and Rhythm: Normal rate and regular rhythm.      Pulses: Normal pulses.      Heart sounds: Normal heart sounds. No murmur heard.     No friction rub. No gallop.   Pulmonary:      Effort: Pulmonary effort is normal. No respiratory distress.      Breath sounds: Normal breath sounds. No wheezing.   Abdominal:      Palpations: Abdomen is soft.      Tenderness: There is no abdominal tenderness.   Skin:     General: Skin is warm and dry.      Coloration: Skin is not pale.      Findings: Rash present. No erythema.   Neurological:      Mental Status: She is alert and oriented to person, place, and time.   Psychiatric:         Mood and Affect: Mood normal.         Behavior: Behavior normal.               Assessment & Plan     Problem List Items Addressed This Visit          Cardiac/Vascular    Essential hypertension - Primary (Chronic)    Current Assessment & Plan   Blood pressure stable. Continue current medications.          Relevant Orders    CBC Auto Differential (Completed)    Basic Metabolic Panel       Endocrine    Prediabetes    Current Assessment & Plan   Labs today. Lifestyle modifications.         " "Relevant Orders    Hemoglobin A1C       Other    Other insomnia    Current Assessment & Plan   Stable. Ambien prn.          Relevant Medications    zolpidem (AMBIEN) 5 MG Tab     Other Visit Diagnoses         Tinea        Relevant Medications    clotrimazole-betamethasone 1-0.05% (LOTRISONE) cream            Assessment & Plan    MEDICAL DECISION MAKING:  - Assessed circular rash on leg, likely fungal ringworm infection.  - Noted small areas of petechiae on leg, warranting further investigation.  - Ruled out impetigo based on current rash presentation.  - Prescribed topical antifungal treatment with steroid component for rash management.    PATIENT EDUCATION:  - Explained the possibility of ringworm transmission from cats.  - Discussed the typical presentation and symptoms of ringworm, noting that itching is common but may not be present in early stages.      MEDICATIONS:  - Started clotrimazole  for fungal rash, apply to affected areas for 2 weeks.  - Continued Ambien.    ORDERS:  - Ordered A1C test.  - Ordered CBC.          Follow up in about 6 months (around 11/23/2025), or if symptoms worsen or fail to improve, for hypertension.    Visit today included increased complexity associated with the care of the episodic problem  addressed and managing the longitudinal care of the patient due to the serious and/or complex managed problem(s) .        Portions of this note may have been created with voice recognition software. Occasional "wrong-word" or "sound-a-like" substitutions may have occurred due to the inherent limitations of voice recognition software. Please, read the note carefully and recognize, using context, where substitutions have occurred.       This note was generated with the assistance of ambient listening technology. Verbal consent was obtained by the patient and accompanying visitor(s) for the recording of patient appointment to facilitate this note. I attest to having reviewed and edited the " generated note for accuracy, though some syntax or spelling errors may persist. Please contact the author of this note for any clarification.            [1]   Patient Active Problem List  Diagnosis    Other insomnia    Essential hypertension    Prediabetes   [2]   Current Outpatient Medications:     amLODIPine (NORVASC) 5 MG tablet, TAKE 1 TABLET ONE TIME DAILY, Disp: 90 tablet, Rfl: 3    aspirin (ECOTRIN) 81 MG EC tablet, 81 mg., Disp: , Rfl:     beta-carotene,A,-vits C,E/mins (OCUVITE ORAL), Take 1 tablet by mouth once daily., Disp: , Rfl:     COD LIVER OIL ORAL, Take 1 tablet by mouth once daily., Disp: , Rfl:     glucosamine-chondroitin 500-400 mg tablet, Take 1 tablet by mouth 3 (three) times daily., Disp: , Rfl:     hydroCHLOROthiazide (HYDRODIURIL) 12.5 MG Tab, TAKE 1 TABLET ONE TIME DAILY, Disp: 90 tablet, Rfl: 3    simvastatin (ZOCOR) 40 MG tablet, TAKE 1 TABLET EVERY EVENING, Disp: 90 tablet, Rfl: 3    vitamin D (VITAMIN D3) 1000 units Tab, Take 1,000 Units by mouth once daily., Disp: , Rfl:     clotrimazole-betamethasone 1-0.05% (LOTRISONE) cream, Apply topically 2 (two) times daily., Disp: 45 g, Rfl: 0    zolpidem (AMBIEN) 5 MG Tab, Take 1 tablet (5 mg total) by mouth nightly as needed., Disp: 90 tablet, Rfl: 1

## 2025-05-24 LAB
EAG (OHS): 111 MG/DL (ref 68–131)
HBA1C MFR BLD: 5.5 % (ref 4–5.6)

## 2025-05-26 ENCOUNTER — RESULTS FOLLOW-UP (OUTPATIENT)
Dept: INTERNAL MEDICINE | Facility: CLINIC | Age: 85
End: 2025-05-26

## 2025-06-17 DIAGNOSIS — B35.9 TINEA: ICD-10-CM

## 2025-06-18 RX ORDER — CLOTRIMAZOLE AND BETAMETHASONE DIPROPIONATE 10; .64 MG/G; MG/G
CREAM TOPICAL 2 TIMES DAILY
Qty: 45 G | Refills: 0 | Status: SHIPPED | OUTPATIENT
Start: 2025-06-18

## (undated) DEVICE — SUT CTD VICRYL 2-0 UND SUTU

## (undated) DEVICE — APPLICATOR CHLORAPREP ORN 26ML

## (undated) DEVICE — SUT MONOCRYL 4.0 PS2 CP496G

## (undated) DEVICE — GOWN POLY REINF BRTH SLV XL

## (undated) DEVICE — NDL ECLIPSE SAF REG 25GX1.5IN

## (undated) DEVICE — SUT VICRYL CTD 2-0 GI 27 SH

## (undated) DEVICE — SOL NACL IRR 1000ML BTL

## (undated) DEVICE — SUT VICRYL 0 SH

## (undated) DEVICE — PACK BASIC SETUP SC BR

## (undated) DEVICE — SPONGE COTTON TRAY 4X4IN

## (undated) DEVICE — DRAPE T TRNSVRS LAP 102X78X121

## (undated) DEVICE — DRAIN PENROSE STD 18X1/2IN

## (undated) DEVICE — ADHESIVE DERMABOND ADVANCED

## (undated) DEVICE — DRESSING TRANS 4X4 TEGADERM

## (undated) DEVICE — COVER LIGHT HANDLE 80/CA

## (undated) DEVICE — SUT MONOCYRL 4-0 PS2 UND

## (undated) DEVICE — SUT VICRYL 3-0 27 SH

## (undated) DEVICE — ELECTRODE REM PLYHSV RETURN 9

## (undated) DEVICE — SYR LUER LOCK STERILE 10ML

## (undated) DEVICE — MANIFOLD 4 PORT